# Patient Record
Sex: FEMALE | Race: WHITE | Employment: OTHER | URBAN - METROPOLITAN AREA
[De-identification: names, ages, dates, MRNs, and addresses within clinical notes are randomized per-mention and may not be internally consistent; named-entity substitution may affect disease eponyms.]

---

## 2017-02-09 ENCOUNTER — TELEPHONE (OUTPATIENT)
Dept: INTERNAL MEDICINE CLINIC | Facility: CLINIC | Age: 77
End: 2017-02-09

## 2018-01-01 ENCOUNTER — TELEPHONE (OUTPATIENT)
Dept: INTERNAL MEDICINE CLINIC | Facility: CLINIC | Age: 78
End: 2018-01-01

## 2018-01-01 ENCOUNTER — NURSE ONLY (OUTPATIENT)
Dept: INTERNAL MEDICINE CLINIC | Facility: CLINIC | Age: 78
End: 2018-01-01
Payer: MEDICARE

## 2018-01-01 ENCOUNTER — HOSPITAL ENCOUNTER (OUTPATIENT)
Dept: BONE DENSITY | Age: 78
Discharge: HOME | End: 2018-01-01
Attending: FAMILY MEDICINE
Payer: MEDICARE

## 2018-01-01 ENCOUNTER — HOSPITAL ENCOUNTER (OUTPATIENT)
Dept: MAMMOGRAPHY | Age: 78
Discharge: HOME | End: 2018-01-01
Attending: FAMILY MEDICINE
Payer: MEDICARE

## 2018-01-01 ENCOUNTER — APPOINTMENT (OUTPATIENT)
Dept: LAB | Age: 78
End: 2018-01-01
Attending: FAMILY MEDICINE
Payer: MEDICARE

## 2018-01-01 ENCOUNTER — OFFICE VISIT (OUTPATIENT)
Dept: INTERNAL MEDICINE CLINIC | Facility: CLINIC | Age: 78
End: 2018-01-01
Payer: MEDICARE

## 2018-01-01 VITALS
TEMPERATURE: 98 F | RESPIRATION RATE: 18 BRPM | SYSTOLIC BLOOD PRESSURE: 124 MMHG | DIASTOLIC BLOOD PRESSURE: 72 MMHG | OXYGEN SATURATION: 95 % | BODY MASS INDEX: 24 KG/M2 | HEART RATE: 88 BPM | WEIGHT: 134 LBS

## 2018-01-01 DIAGNOSIS — Z12.31 VISIT FOR SCREENING MAMMOGRAM: ICD-10-CM

## 2018-01-01 DIAGNOSIS — I25.10 CORONARY ARTERY DISEASE INVOLVING NATIVE CORONARY ARTERY OF NATIVE HEART WITHOUT ANGINA PECTORIS: ICD-10-CM

## 2018-01-01 DIAGNOSIS — M81.0 AGE-RELATED OSTEOPOROSIS WITHOUT CURRENT PATHOLOGICAL FRACTURE: Primary | ICD-10-CM

## 2018-01-01 DIAGNOSIS — M81.0 AGE-RELATED OSTEOPOROSIS WITHOUT CURRENT PATHOLOGICAL FRACTURE: ICD-10-CM

## 2018-01-01 DIAGNOSIS — S61.459A: ICD-10-CM

## 2018-01-01 DIAGNOSIS — R30.0 DYSURIA: ICD-10-CM

## 2018-01-01 DIAGNOSIS — E78.00 HYPERCHOLESTEROLEMIA: ICD-10-CM

## 2018-01-01 DIAGNOSIS — I10 ESSENTIAL HYPERTENSION: Primary | ICD-10-CM
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
## 2018-01-01 DIAGNOSIS — R73.09 ELEVATED GLUCOSE: ICD-10-CM

## 2018-01-01 PROCEDURE — 82040 ASSAY OF SERUM ALBUMIN: CPT

## 2018-01-01 PROCEDURE — 84100 ASSAY OF PHOSPHORUS: CPT

## 2018-01-01 PROCEDURE — 80048 BASIC METABOLIC PNL TOTAL CA: CPT

## 2018-01-01 PROCEDURE — 87186 SC STD MICRODIL/AGAR DIL: CPT

## 2018-01-01 PROCEDURE — 83735 ASSAY OF MAGNESIUM: CPT

## 2018-01-01 PROCEDURE — 87077 CULTURE AEROBIC IDENTIFY: CPT

## 2018-01-01 PROCEDURE — 87086 URINE CULTURE/COLONY COUNT: CPT

## 2018-01-01 PROCEDURE — 99214 OFFICE O/P EST MOD 30 MIN: CPT | Performed by: FAMILY MEDICINE

## 2018-01-01 PROCEDURE — 36415 COLL VENOUS BLD VENIPUNCTURE: CPT

## 2018-01-01 PROCEDURE — 96372 THER/PROPH/DIAG INJ SC/IM: CPT | Performed by: FAMILY MEDICINE

## 2018-01-01 PROCEDURE — 77063 BREAST TOMOSYNTHESIS BI: CPT | Performed by: FAMILY MEDICINE

## 2018-01-01 PROCEDURE — 77067 SCR MAMMO BI INCL CAD: CPT | Performed by: FAMILY MEDICINE

## 2018-01-01 PROCEDURE — 77080 DXA BONE DENSITY AXIAL: CPT | Performed by: FAMILY MEDICINE

## 2018-01-01 PROCEDURE — 82306 VITAMIN D 25 HYDROXY: CPT

## 2018-01-01 RX ORDER — HYDRALAZINE HYDROCHLORIDE 100 MG/1
TABLET, FILM COATED ORAL
Qty: 180 TABLET | Refills: 0 | OUTPATIENT
Start: 2018-01-01

## 2018-01-01 RX ORDER — CEPHALEXIN 500 MG/1
500 CAPSULE ORAL 2 TIMES DAILY
Qty: 20 CAPSULE | Refills: 0 | Status: SHIPPED | OUTPATIENT
Start: 2018-01-01 | End: 2019-01-01 | Stop reason: ALTCHOICE

## 2018-01-01 RX ORDER — LOSARTAN POTASSIUM 50 MG/1
TABLET ORAL
Qty: 90 TABLET | Refills: 0 | Status: SHIPPED | OUTPATIENT
Start: 2018-01-01 | End: 2019-01-01

## 2018-01-01 RX ORDER — SIMVASTATIN 20 MG
TABLET ORAL
Qty: 90 TABLET | Refills: 0 | OUTPATIENT
Start: 2018-01-01

## 2018-01-01 RX ORDER — METHENAMINE HIPPURATE 1000 MG/1
TABLET ORAL
Qty: 60 TABLET | Refills: 0 | OUTPATIENT
Start: 2018-01-01

## 2018-01-01 RX ORDER — SIMVASTATIN 10 MG
TABLET ORAL
Qty: 90 TABLET | Refills: 0 | Status: SHIPPED | OUTPATIENT
Start: 2018-01-01 | End: 2019-01-01

## 2018-01-01 RX ORDER — SPIRONOLACTONE 25 MG/1
TABLET ORAL
Qty: 90 TABLET | Refills: 0 | Status: SHIPPED | OUTPATIENT
Start: 2018-01-01 | End: 2019-01-01

## 2018-01-01 RX ORDER — LOSARTAN POTASSIUM 50 MG/1
TABLET ORAL
Qty: 90 TABLET | Refills: 0 | Status: SHIPPED | OUTPATIENT
Start: 2018-01-01 | End: 2018-01-01

## 2018-01-01 RX ORDER — LOSARTAN POTASSIUM 50 MG/1
TABLET ORAL
Qty: 90 TABLET | Refills: 0 | OUTPATIENT
Start: 2018-01-01

## 2018-01-01 RX ORDER — SIMVASTATIN 10 MG
TABLET ORAL
Qty: 90 TABLET | Refills: 0 | OUTPATIENT
Start: 2018-01-01

## 2018-01-01 RX ORDER — METHENAMINE HIPPURATE 1000 MG/1
TABLET ORAL
Qty: 60 TABLET | Refills: 0 | Status: SHIPPED | OUTPATIENT
Start: 2018-01-01 | End: 2018-01-01

## 2018-01-01 RX ORDER — HYDRALAZINE HYDROCHLORIDE 100 MG/1
TABLET, FILM COATED ORAL
Qty: 180 TABLET | Refills: 0 | Status: SHIPPED | OUTPATIENT
Start: 2018-01-01 | End: 2019-01-01

## 2018-01-01 RX ORDER — HYDRALAZINE HYDROCHLORIDE 100 MG/1
TABLET, FILM COATED ORAL
Qty: 180 TABLET | Refills: 0 | Status: SHIPPED | OUTPATIENT
Start: 2018-01-01 | End: 2018-01-01

## 2018-01-01 RX ORDER — METHENAMINE HIPPURATE 1000 MG/1
TABLET ORAL
Qty: 60 TABLET | Refills: 2 | Status: SHIPPED | OUTPATIENT
Start: 2018-01-01 | End: 2019-01-01

## 2018-02-02 ENCOUNTER — APPOINTMENT (OUTPATIENT)
Dept: CT IMAGING | Facility: HOSPITAL | Age: 78
End: 2018-02-02
Attending: EMERGENCY MEDICINE
Payer: COMMERCIAL

## 2018-02-02 ENCOUNTER — HOSPITAL ENCOUNTER (EMERGENCY)
Facility: HOSPITAL | Age: 78
Discharge: HOME | End: 2018-02-02
Attending: EMERGENCY MEDICINE
Payer: COMMERCIAL

## 2018-02-02 VITALS
RESPIRATION RATE: 16 BRPM | OXYGEN SATURATION: 100 % | DIASTOLIC BLOOD PRESSURE: 81 MMHG | HEART RATE: 64 BPM | TEMPERATURE: 97 F | SYSTOLIC BLOOD PRESSURE: 151 MMHG

## 2018-02-02 DIAGNOSIS — S00.83XA CONTUSION OF FACE, INITIAL ENCOUNTER: Primary | ICD-10-CM

## 2018-02-02 DIAGNOSIS — T07.XXXA MULTIPLE ABRASIONS: ICD-10-CM

## 2018-02-02 PROCEDURE — 99284 EMERGENCY DEPT VISIT MOD MDM: CPT

## 2018-02-02 PROCEDURE — 70450 CT HEAD/BRAIN W/O DYE: CPT | Performed by: EMERGENCY MEDICINE

## 2018-02-02 RX ORDER — GARLIC EXTRACT 500 MG
1 CAPSULE ORAL DAILY
COMMUNITY
End: 2019-01-01 | Stop reason: CLARIF

## 2018-02-02 RX ORDER — LOSARTAN POTASSIUM 50 MG/1
50 TABLET ORAL DAILY
COMMUNITY
End: 2018-03-06

## 2018-02-02 RX ORDER — HYDRALAZINE HYDROCHLORIDE 100 MG/1
100 TABLET, FILM COATED ORAL 2 TIMES DAILY
COMMUNITY
End: 2018-01-01

## 2018-02-02 RX ORDER — SPIRONOLACTONE 25 MG/1
12.5 TABLET ORAL DAILY
COMMUNITY
End: 2018-01-01

## 2018-02-02 RX ORDER — METHENAMINE HIPPURATE 1000 MG/1
1 TABLET ORAL 2 TIMES DAILY
COMMUNITY
End: 2018-03-06

## 2018-02-02 RX ORDER — REPAGLINIDE 0.5 MG/1
0.5 TABLET ORAL DAILY
COMMUNITY
End: 2019-01-01

## 2018-02-02 NOTE — ED INITIAL ASSESSMENT (HPI)
Patient arrives after falling outside and hitting left side of head in the mulch. Patient had no LOC, not taking blood thinners. Hx of kidney transplant in 2009. Not UTD on tetanus.

## 2018-02-03 NOTE — ED PROVIDER NOTES
Patient Seen in: [facility] Emergency Department    History   Patient presents with:  Trauma (cardiovascular, musculoskeletal)    Stated Complaint: fall    HPI    Patient presents with a head injury.   The patient was walking out of [place] and matthew tenderness, EOMI, pupils equal round and reactive to light, oropharynx clear, uvula midline. Neck: No midline C-spine tenderness. Cardiovascular: Regular rate and rhythm, no murmurs. Respiratory: Lungs clear to auscultation.   Extremities: No bony tender 02 1835  ------------------------------------------------------------  The patient's wounds were cleaned and did not require repair. MDM     The patient's imaging is reassuring in terms of trauma.   She was counseled regarding the sinus disease that was

## 2018-02-16 ENCOUNTER — PRIOR ORIGINAL RECORDS (OUTPATIENT)
Dept: OTHER | Age: 78
End: 2018-02-16

## 2018-02-16 ENCOUNTER — LAB ENCOUNTER (OUTPATIENT)
Dept: LAB | Age: 78
End: 2018-02-16
Attending: INTERNAL MEDICINE
Payer: MEDICARE

## 2018-02-16 DIAGNOSIS — Q61.3 POLYCYSTIC KIDNEY: ICD-10-CM

## 2018-02-16 DIAGNOSIS — Z94.81 BONE MARROW TRANSPLANT STATUS (HCC): ICD-10-CM

## 2018-02-16 DIAGNOSIS — I12.9 MALIGNANT HYPERTENSIVE KIDNEY DISEASE WITH CHRONIC KIDNEY DISEASE STAGE I THROUGH STAGE IV, OR UNSPECIFIED(403.00): Primary | ICD-10-CM

## 2018-02-16 LAB
25-HYDROXYVITAMIN D (TOTAL): 73 NG/ML (ref 30–100)
ALBUMIN SERPL-MCNC: 3.9 G/DL (ref 3.5–4.8)
ALP LIVER SERPL-CCNC: 53 U/L (ref 55–142)
ALT SERPL-CCNC: 25 U/L (ref 14–54)
AST SERPL-CCNC: 17 U/L (ref 15–41)
BASOPHILS # BLD AUTO: 0.06 X10(3) UL (ref 0–0.1)
BASOPHILS NFR BLD AUTO: 0.9 %
BILIRUB SERPL-MCNC: 1.1 MG/DL (ref 0.1–2)
BILIRUB UR QL STRIP.AUTO: NEGATIVE
BUN BLD-MCNC: 27 MG/DL (ref 8–20)
CALCIUM BLD-MCNC: 10 MG/DL (ref 8.3–10.3)
CHLORIDE: 106 MMOL/L (ref 101–111)
CO2: 26 MMOL/L (ref 22–32)
CREAT BLD-MCNC: 0.73 MG/DL (ref 0.55–1.02)
CREAT UR-SCNC: 17 MG/DL
CREAT UR-SCNC: 17 MG/DL
EOSINOPHIL # BLD AUTO: 0.13 X10(3) UL (ref 0–0.3)
EOSINOPHIL NFR BLD AUTO: 1.9 %
ERYTHROCYTE [DISTWIDTH] IN BLOOD BY AUTOMATED COUNT: 14.8 % (ref 11.5–16)
EST. AVERAGE GLUCOSE BLD GHB EST-MCNC: 140 MG/DL (ref 68–126)
GLUCOSE BLD-MCNC: 132 MG/DL (ref 70–99)
GLUCOSE UR STRIP.AUTO-MCNC: NEGATIVE MG/DL
HAV IGM SER QL: 1.9 MG/DL (ref 1.7–3)
HBA1C MFR BLD HPLC: 6.5 % (ref ?–5.7)
HCT VFR BLD AUTO: 45.9 % (ref 34–50)
HGB BLD-MCNC: 14 G/DL (ref 12–16)
IMMATURE GRANULOCYTE COUNT: 0.03 X10(3) UL (ref 0–1)
IMMATURE GRANULOCYTE RATIO %: 0.4 %
KETONES UR STRIP.AUTO-MCNC: NEGATIVE MG/DL
LYMPHOCYTES # BLD AUTO: 0.87 X10(3) UL (ref 0.9–4)
LYMPHOCYTES NFR BLD AUTO: 12.6 %
M PROTEIN MFR SERPL ELPH: 6.8 G/DL (ref 6.1–8.3)
MCH RBC QN AUTO: 27.9 PG (ref 27–33.2)
MCHC RBC AUTO-ENTMCNC: 30.5 G/DL (ref 31–37)
MCV RBC AUTO: 91.6 FL (ref 81–100)
MICROALBUMIN UR-MCNC: 1.39 MG/DL
MICROALBUMIN/CREAT 24H UR-RTO: 81.8 UG/MG (ref ?–30)
MONOCYTES # BLD AUTO: 0.72 X10(3) UL (ref 0.1–1)
MONOCYTES NFR BLD AUTO: 10.5 %
NEUTROPHIL ABS PRELIM: 5.07 X10 (3) UL (ref 1.3–6.7)
NEUTROPHILS # BLD AUTO: 5.07 X10(3) UL (ref 1.3–6.7)
NEUTROPHILS NFR BLD AUTO: 73.7 %
NITRITE UR QL STRIP.AUTO: NEGATIVE
PH UR STRIP.AUTO: 6 [PH] (ref 4.5–8)
PHOSPHATE SERPL-MCNC: 2.6 MG/DL (ref 2.5–4.9)
PLATELET # BLD AUTO: 234 10(3)UL (ref 150–450)
POTASSIUM SERPL-SCNC: 4.6 MMOL/L (ref 3.6–5.1)
PROT UR STRIP.AUTO-MCNC: NEGATIVE MG/DL
PROT UR-MCNC: <5 MG/DL
PTH-INTACT SERPL-MCNC: 91.1 PG/ML (ref 11.1–79.5)
RBC # BLD AUTO: 5.01 X10(6)UL (ref 3.8–5.1)
RBC UR QL AUTO: NEGATIVE
RED CELL DISTRIBUTION WIDTH-SD: 50.4 FL (ref 35.1–46.3)
SODIUM SERPL-SCNC: 139 MMOL/L (ref 136–144)
SP GR UR STRIP.AUTO: 1 (ref 1–1.03)
UROBILINOGEN UR STRIP.AUTO-MCNC: <2 MG/DL
WBC # BLD AUTO: 6.9 X10(3) UL (ref 4–13)

## 2018-02-16 PROCEDURE — 81001 URINALYSIS AUTO W/SCOPE: CPT

## 2018-02-16 PROCEDURE — 80197 ASSAY OF TACROLIMUS: CPT

## 2018-02-16 PROCEDURE — 85025 COMPLETE CBC W/AUTO DIFF WBC: CPT

## 2018-02-16 PROCEDURE — 87799 DETECT AGENT NOS DNA QUANT: CPT

## 2018-02-16 PROCEDURE — 84156 ASSAY OF PROTEIN URINE: CPT

## 2018-02-16 PROCEDURE — 82306 VITAMIN D 25 HYDROXY: CPT

## 2018-02-16 PROCEDURE — 83735 ASSAY OF MAGNESIUM: CPT

## 2018-02-16 PROCEDURE — 84100 ASSAY OF PHOSPHORUS: CPT

## 2018-02-16 PROCEDURE — 82570 ASSAY OF URINE CREATININE: CPT

## 2018-02-16 PROCEDURE — 80053 COMPREHEN METABOLIC PANEL: CPT

## 2018-02-16 PROCEDURE — 83036 HEMOGLOBIN GLYCOSYLATED A1C: CPT

## 2018-02-16 PROCEDURE — 80061 LIPID PANEL: CPT

## 2018-02-16 PROCEDURE — 83970 ASSAY OF PARATHORMONE: CPT

## 2018-02-16 PROCEDURE — 82043 UR ALBUMIN QUANTITATIVE: CPT

## 2018-02-16 PROCEDURE — 82652 VIT D 1 25-DIHYDROXY: CPT

## 2018-02-16 PROCEDURE — 36415 COLL VENOUS BLD VENIPUNCTURE: CPT

## 2018-02-18 LAB — TACROLIMUS: 5.3 NG/ML

## 2018-02-19 LAB
BK VIRUS DNA, QUANT COPY/ML: <390 CPY/ML
BK VIRUS DNA, QUANT INTERP: NOT DETECTED
BK VIRUS DNA, QUANTITATION: <2.6 LOG
CHOLEST SMN-MCNC: 156 MG/DL (ref ?–200)
HDLC SERPL-MCNC: 59 MG/DL (ref 45–?)
HDLC SERPL: 2.64 {RATIO} (ref ?–4.44)
LDLC SERPL CALC-MCNC: 75 MG/DL (ref ?–130)
NONHDLC SERPL-MCNC: 97 MG/DL (ref ?–130)
TRIGL SERPL-MCNC: 109 MG/DL (ref ?–150)
VLDLC SERPL CALC-MCNC: 22 MG/DL (ref 5–40)

## 2018-03-06 ENCOUNTER — OFFICE VISIT (OUTPATIENT)
Dept: INTERNAL MEDICINE CLINIC | Facility: CLINIC | Age: 78
End: 2018-03-06

## 2018-03-06 VITALS
TEMPERATURE: 98 F | HEIGHT: 62.75 IN | WEIGHT: 135.5 LBS | HEART RATE: 64 BPM | BODY MASS INDEX: 24.31 KG/M2 | DIASTOLIC BLOOD PRESSURE: 72 MMHG | RESPIRATION RATE: 16 BRPM | SYSTOLIC BLOOD PRESSURE: 132 MMHG

## 2018-03-06 DIAGNOSIS — H40.9 GLAUCOMA, UNSPECIFIED GLAUCOMA TYPE, UNSPECIFIED LATERALITY: ICD-10-CM

## 2018-03-06 DIAGNOSIS — E78.00 HYPERCHOLESTEROLEMIA: ICD-10-CM

## 2018-03-06 DIAGNOSIS — R73.09 ELEVATED GLUCOSE: ICD-10-CM

## 2018-03-06 DIAGNOSIS — Z00.00 INITIAL MEDICARE ANNUAL WELLNESS VISIT: Primary | ICD-10-CM

## 2018-03-06 DIAGNOSIS — Q61.3 POLYCYSTIC KIDNEY DISEASE: ICD-10-CM

## 2018-03-06 DIAGNOSIS — Z78.0 POSTMENOPAUSAL: ICD-10-CM

## 2018-03-06 DIAGNOSIS — M81.0 AGE RELATED OSTEOPOROSIS, UNSPECIFIED PATHOLOGICAL FRACTURE PRESENCE: ICD-10-CM

## 2018-03-06 DIAGNOSIS — Z00.00 ENCOUNTER FOR ANNUAL HEALTH EXAMINATION: ICD-10-CM

## 2018-03-06 DIAGNOSIS — I10 ESSENTIAL HYPERTENSION: ICD-10-CM

## 2018-03-06 DIAGNOSIS — Z12.31 VISIT FOR SCREENING MAMMOGRAM: ICD-10-CM

## 2018-03-06 DIAGNOSIS — R79.89 ELEVATED PTHRP LEVEL: ICD-10-CM

## 2018-03-06 DIAGNOSIS — N39.0 CHRONIC UTI: ICD-10-CM

## 2018-03-06 DIAGNOSIS — I25.10 CORONARY ARTERY DISEASE INVOLVING NATIVE CORONARY ARTERY OF NATIVE HEART WITHOUT ANGINA PECTORIS: ICD-10-CM

## 2018-03-06 PROCEDURE — G0438 PPPS, INITIAL VISIT: HCPCS | Performed by: FAMILY MEDICINE

## 2018-03-06 PROCEDURE — 99203 OFFICE O/P NEW LOW 30 MIN: CPT | Performed by: FAMILY MEDICINE

## 2018-03-06 RX ORDER — LOSARTAN POTASSIUM 50 MG/1
50 TABLET ORAL DAILY
Qty: 90 TABLET | Refills: 1 | Status: SHIPPED | OUTPATIENT
Start: 2018-03-06 | End: 2018-01-01

## 2018-03-06 RX ORDER — METHENAMINE HIPPURATE 1000 MG/1
1 TABLET ORAL 2 TIMES DAILY
Qty: 60 TABLET | Refills: 5 | Status: SHIPPED | OUTPATIENT
Start: 2018-03-06 | End: 2018-08-10

## 2018-03-06 NOTE — PROGRESS NOTES
HPI:   Roxane Quiroz is a 69year old former female pt who presents for a Medicare Initial Annual Wellness visit (Once after 12 month Medicare anniversary) .   Last appt w me in 2014    Seeing Drs out of [place] are current        Her la care planning including the explanation and discussion of advance directives standard forms performed Face to Face with patient and Family/surrogate (if present), and forms available to patient in AVS       She does NOT have a Power of  for Sunust [amlodipine]. CURRENT MEDICATIONS:     Outpatient Prescriptions Marked as Taking for the 3/6/18 encounter (Office Visit) with Gricelda Stark MD:  Methenamine Hippurate 1 g Oral Tab Take 1 tablet (1 g total) by mouth 2 (two) times daily.    Losartan [place] transplantation of kidney (3/7/2009); and colonoscopy (6/2013    fall 2017). Her family history includes Cancer in her maternal grandfather; Heart Disease in her maternal grandmother; Stroke in her mother.    SOCIAL HISTORY:   She  reports that she has q auscultation bilaterally, respirations unlabored   Heart:  Regular rate and rhythm, S1 and S2 normal, no murmur, rub, or gallop   Abdomen:   Soft, non-tender, bowel sounds active all four quadrants,  no masses, no organomegaly   Pelvic: Deferred   Extremit Glaucoma, unspecified glaucoma type, unspecified laterality  Plan: seeing eye dr    On  Drops      (M81.0) Age related osteoporosis, unspecified pathological fracture presence  Plan: on  fosamax    (R73.09) Elevated glucose  Plan: discussed her labs Occult Blood Annually No results found for: FOB No flowsheet data found. Glaucoma Screening      Ophthalmology Visit Annually: Diabetics, FHx Glaucoma, AA>50, > 65 No flowsheet data found.     Bone Density Screening      Dexascan Every two years or Procedure      Annual Monitoring of Persistent     Medications (ACE/ARB, digoxin diuretics, anticonvulsants.)    Potassium  Annually Potassium (mmol/L)   Date Value   02/16/2018 4.6    No flowsheet data found.     Creatinine  Annually Creatinine (mg/dL)

## 2018-03-06 NOTE — PATIENT INSTRUCTIONS
Marsha Glynn's SCREENING SCHEDULE   Tests on this list are recommended by your physician but may not be covered, or covered at this frequency, by your insurer. Please check with your insurance carrier before scheduling to verify coverage.    PREVENTATI Covered every 10 years- more often if abnormal There are no preventive care reminders to display for this patient.  Update Health Maintenance if applicable    Flex Sigmoidoscopy Screen  Covered every 5 years No results found for this or any previous visit Please get once after your 65th birthday    Pneumococcal 23 (Pneumovax)  Covered Once after 65 No orders found for this or any previous visit.  Please get once after your 65th birthday    Hepatitis B for Moderate/High Risk       No orders found for this or

## 2018-03-12 ENCOUNTER — PRIOR ORIGINAL RECORDS (OUTPATIENT)
Dept: OTHER | Age: 78
End: 2018-03-12

## 2018-03-12 ENCOUNTER — LAB ENCOUNTER (OUTPATIENT)
Dept: LAB | Age: 78
End: 2018-03-12
Attending: INTERNAL MEDICINE
Payer: MEDICARE

## 2018-03-12 DIAGNOSIS — Z94.0 KIDNEY REPLACED BY TRANSPLANT: Primary | ICD-10-CM

## 2018-03-12 LAB
ALBUMIN SERPL-MCNC: 3.7 G/DL (ref 3.5–4.8)
ALP LIVER SERPL-CCNC: 59 U/L (ref 55–142)
ALT SERPL-CCNC: 21 U/L (ref 14–54)
AST SERPL-CCNC: 16 U/L (ref 15–41)
BASOPHILS # BLD AUTO: 0.05 X10(3) UL (ref 0–0.1)
BASOPHILS NFR BLD AUTO: 0.5 %
BILIRUB SERPL-MCNC: 0.6 MG/DL (ref 0.1–2)
BILIRUB UR QL STRIP.AUTO: NEGATIVE
BUN BLD-MCNC: 22 MG/DL (ref 8–20)
CALCIUM BLD-MCNC: 9.3 MG/DL (ref 8.3–10.3)
CHLORIDE: 103 MMOL/L (ref 101–111)
CLARITY UR REFRACT.AUTO: CLEAR
CO2: 27 MMOL/L (ref 22–32)
COLOR UR AUTO: YELLOW
CREAT BLD-MCNC: 0.75 MG/DL (ref 0.55–1.02)
CREAT UR-SCNC: 24.8 MG/DL
EOSINOPHIL # BLD AUTO: 0.17 X10(3) UL (ref 0–0.3)
EOSINOPHIL NFR BLD AUTO: 1.7 %
ERYTHROCYTE [DISTWIDTH] IN BLOOD BY AUTOMATED COUNT: 14.4 % (ref 11.5–16)
GLUCOSE BLD-MCNC: 111 MG/DL (ref 70–99)
GLUCOSE UR STRIP.AUTO-MCNC: NEGATIVE MG/DL
HAV IGM SER QL: 1.7 MG/DL (ref 1.7–3)
HCT VFR BLD AUTO: 45 % (ref 34–50)
HGB BLD-MCNC: 13.3 G/DL (ref 12–16)
IMMATURE GRANULOCYTE COUNT: 0.05 X10(3) UL (ref 0–1)
IMMATURE GRANULOCYTE RATIO %: 0.5 %
KETONES UR STRIP.AUTO-MCNC: NEGATIVE MG/DL
LYMPHOCYTES # BLD AUTO: 1.49 X10(3) UL (ref 0.9–4)
LYMPHOCYTES NFR BLD AUTO: 14.6 %
M PROTEIN MFR SERPL ELPH: 6.6 G/DL (ref 6.1–8.3)
MCH RBC QN AUTO: 27.3 PG (ref 27–33.2)
MCHC RBC AUTO-ENTMCNC: 29.6 G/DL (ref 31–37)
MCV RBC AUTO: 92.2 FL (ref 81–100)
MONOCYTES # BLD AUTO: 0.97 X10(3) UL (ref 0.1–1)
MONOCYTES NFR BLD AUTO: 9.5 %
NEUTROPHIL ABS PRELIM: 7.46 X10 (3) UL (ref 1.3–6.7)
NEUTROPHILS # BLD AUTO: 7.46 X10(3) UL (ref 1.3–6.7)
NEUTROPHILS NFR BLD AUTO: 73.2 %
NITRITE UR QL STRIP.AUTO: POSITIVE
PH UR STRIP.AUTO: 5 [PH] (ref 4.5–8)
PHOSPHATE SERPL-MCNC: 2.5 MG/DL (ref 2.5–4.9)
PLATELET # BLD AUTO: 264 10(3)UL (ref 150–450)
POTASSIUM SERPL-SCNC: 3.9 MMOL/L (ref 3.6–5.1)
PROT UR STRIP.AUTO-MCNC: NEGATIVE MG/DL
PROT UR-MCNC: <5 MG/DL
RBC # BLD AUTO: 4.88 X10(6)UL (ref 3.8–5.1)
RBC UR QL AUTO: NEGATIVE
RED CELL DISTRIBUTION WIDTH-SD: 48.6 FL (ref 35.1–46.3)
SODIUM SERPL-SCNC: 136 MMOL/L (ref 136–144)
SP GR UR STRIP.AUTO: 1.01 (ref 1–1.03)
UROBILINOGEN UR STRIP.AUTO-MCNC: <2 MG/DL
WBC # BLD AUTO: 10.2 X10(3) UL (ref 4–13)

## 2018-03-12 PROCEDURE — 85025 COMPLETE CBC W/AUTO DIFF WBC: CPT

## 2018-03-12 PROCEDURE — 81001 URINALYSIS AUTO W/SCOPE: CPT

## 2018-03-12 PROCEDURE — 83735 ASSAY OF MAGNESIUM: CPT

## 2018-03-12 PROCEDURE — 82570 ASSAY OF URINE CREATININE: CPT

## 2018-03-12 PROCEDURE — 36415 COLL VENOUS BLD VENIPUNCTURE: CPT

## 2018-03-12 PROCEDURE — 80197 ASSAY OF TACROLIMUS: CPT

## 2018-03-12 PROCEDURE — 84156 ASSAY OF PROTEIN URINE: CPT

## 2018-03-12 PROCEDURE — 80053 COMPREHEN METABOLIC PANEL: CPT

## 2018-03-12 PROCEDURE — 84100 ASSAY OF PHOSPHORUS: CPT

## 2018-03-14 LAB — TACROLIMUS: 4.8 NG/ML

## 2018-04-24 ENCOUNTER — TELEPHONE (OUTPATIENT)
Dept: NEPHROLOGY | Facility: CLINIC | Age: 78
End: 2018-04-24

## 2018-06-05 ENCOUNTER — OFFICE VISIT (OUTPATIENT)
Dept: NEPHROLOGY | Facility: CLINIC | Age: 78
End: 2018-06-05

## 2018-06-05 VITALS — DIASTOLIC BLOOD PRESSURE: 74 MMHG | BODY MASS INDEX: 25 KG/M2 | WEIGHT: 140 LBS | SYSTOLIC BLOOD PRESSURE: 136 MMHG

## 2018-06-05 DIAGNOSIS — I10 ESSENTIAL HYPERTENSION: Primary | ICD-10-CM

## 2018-06-05 DIAGNOSIS — Q61.3 PKD (POLYCYSTIC KIDNEY DISEASE): ICD-10-CM

## 2018-06-05 PROBLEM — Z94.0 KIDNEY TRANSPLANT RECIPIENT: Status: ACTIVE | Noted: 2018-06-05

## 2018-06-05 PROCEDURE — 99204 OFFICE O/P NEW MOD 45 MIN: CPT | Performed by: INTERNAL MEDICINE

## 2018-06-05 NOTE — PROGRESS NOTES
Nephrology Consult Note    REASON FOR CONSULT: s/p kidney transplant    ASSESSMENT/PLAN:        1) s/p kidney transplant- at [facility] in 2009 for polycystic kidney disease; was on dialysis for 2 years in [facility] with Dr. Yasmany [place].   Her donor was a father. Her father was on dialysis before he ultimately passed. She has had abdominal aortic aneurysm repair as well as a stent in her right coronary artery; also had a partial hepatectomy as above due to massive cyst.  No history of pulmonary disease. mouth daily. Disp:  Rfl:    HydrALAZINE HCl 100 MG Oral Tab Take 100 mg by mouth 2 (two) times daily. Disp:  Rfl:    tacrolimus (PROGRAF) 1 MG Oral Cap Take 1 mg by mouth 2 (two) times daily.    Disp:  Rfl:    predniSONE (DELTASONE) 5 MG Oral Tab Take 5 mg Comment:diabetic diet   Stress Concern          No  Weight Concern          No         Family History:  Family History   Problem Relation Age of Onset   • Stroke Mother    • Heart Disease Maternal Grandmother    • Cancer Maternal Grandfather         PHYSIC

## 2018-06-13 NOTE — TELEPHONE ENCOUNTER
Metoprolol 25 mg 1 tab bid filled its in as historical     LOV 3-6-18     Follow up in 6 months     Labs 3-12-18

## 2018-08-02 RX ORDER — SIMVASTATIN 10 MG
TABLET ORAL
Qty: 90 TABLET | Refills: 0 | Status: SHIPPED | OUTPATIENT
Start: 2018-08-02 | End: 2018-01-01

## 2018-08-10 NOTE — TELEPHONE ENCOUNTER
Last office visit 3/6/18 with Dr. Bassem Morin. Appointment 9/21/18 with Dr. Bassem Morin. Is this a new medication request?  Not in medication list...

## 2018-08-13 RX ORDER — LANCETS
EACH MISCELLANEOUS
Qty: 100 EACH | Refills: 1 | Status: SHIPPED | OUTPATIENT
Start: 2018-08-13 | End: 2019-01-01

## 2018-08-13 RX ORDER — METHENAMINE HIPPURATE 1000 MG/1
TABLET ORAL
Qty: 60 TABLET | Refills: 1 | Status: SHIPPED | OUTPATIENT
Start: 2018-08-13 | End: 2018-01-01

## 2018-08-13 RX ORDER — PERPHENAZINE 16 MG/1
TABLET, FILM COATED ORAL
Qty: 100 STRIP | Refills: 0 | OUTPATIENT
Start: 2018-08-13

## 2018-08-13 RX ORDER — LANCETS
EACH MISCELLANEOUS
Qty: 100 EACH | Refills: 0 | OUTPATIENT
Start: 2018-08-13

## 2018-08-30 NOTE — TELEPHONE ENCOUNTER
Losartan 50 mg 1 tab daily filled 3/6/18 90 with 1 refill     LOV 3/6/18    Follow up in 6 months     Next apt 9/21/18    Labs

## 2018-09-11 NOTE — TELEPHONE ENCOUNTER
Last office visit 3/6/18 with Dr. Deng Dose. Appointment 9/21/18 with Dr. Deng Dose. Last refill 6/14/18 (#180, 0 refills).

## 2018-10-03 NOTE — PROGRESS NOTES
HPI:    Patient ID: Janak Ramos is a 69year old female.     HPI  Here to go over few things   Needs order for test strips    Had labs done at [facility] in August   Will get me copy   To see Dr Cinthia Strange in Jan     Aware needs bone density    Last was 2 yr ago (DELTASONE) 5 MG Oral Tab Take 5 mg by mouth daily. Disp:  Rfl:    Mycophenolate Sodium (MYFORTIC) 180 MG Oral Tab EC Take 360 mg by mouth 2 (two) times daily.    Disp:  Rfl:    Metoprolol Succinate ER (TOPROL XL) 25 MG Oral Tablet 24 Hr Take 50 mg by mouth SCREENING BILAT (CPT=77067)        Ordered         Essential hypertension  (primary encounter diagnosis)  Hypercholesterolemia  Elevated glucose  Coronary artery disease involving native coronary artery of native heart without angina pectoris    No orders

## 2018-10-05 NOTE — TELEPHONE ENCOUNTER
Patient is requesting to speak with a nurse in regards to her prescription for Glucose Blood (MCKINLEY CONTOUR TEST) In Vitro Strip. She stated that she is having issues refilling the prescription.  [place] [phone]  [facility]

## 2018-10-31 NOTE — TELEPHONE ENCOUNTER
Refill requested:   Requested Prescriptions     Pending Prescriptions Disp Refills   • METHENAMINE HIPPURATE 1 g Oral Tab [Pharmacy Med Name: METHENAMINE HIPPURATE 1GM TABLETS] 60 tablet 0     Sig: TAKE 1 TABLET BY MOUTH TWICE DAILY       Failed protocol-

## 2018-11-29 NOTE — TELEPHONE ENCOUNTER
Methenamine Hippurate  Last OV relevant to medication: 10/3/18  Last refill date: 10/31/18     #/refills: 0  When pt was asked to return for OV: None  Upcoming appt/reason: 4/8/19

## 2018-11-30 NOTE — TELEPHONE ENCOUNTER
----- Message from Brii Chavez MD sent at 11/26/2018  1:04 PM CST -----  + osteoporosis   Is she taking the fosamax as directed?

## 2018-12-17 ENCOUNTER — PRIOR ORIGINAL RECORDS (OUTPATIENT)
Dept: OTHER | Age: 78
End: 2018-12-17

## 2018-12-20 ENCOUNTER — MYAURORA ACCOUNT LINK (OUTPATIENT)
Dept: OTHER | Age: 78
End: 2018-12-20

## 2018-12-20 ENCOUNTER — PRIOR ORIGINAL RECORDS (OUTPATIENT)
Dept: OTHER | Age: 78
End: 2018-12-20

## 2018-12-20 NOTE — TELEPHONE ENCOUNTER
[facility] would like test results/labs for this patient specifically lipids  Please fax to [phone]

## 2018-12-27 LAB
ALBUMIN: 3.3 G/DL
ALBUMIN: 3.9 G/DL
ALKALINE PHOSPHATATE(ALK PHOS): 53 IU/L
BILIRUBIN TOTAL: 1.1 MG/DL
BUN: 22 MG/DL
BUN: 27 MG/DL
CALCIUM: 10 MG/DL
CALCIUM: 9.3 MG/DL
CHLORIDE: 106 MEQ/L
CHLORIDE: 106 MEQ/L
CHOLESTEROL, TOTAL: 156 MG/DL
CREATININE, SERUM: 0.73 MG/DL
CREATININE, SERUM: 0.82 MG/DL
GLUCOSE: 132 MG/DL
GLUCOSE: 97 MG/DL
HDL CHOLESTEROL: 59 MG/DL
HEMATOCRIT: 45 %
HEMOGLOBIN A1C: 6.5 %
HEMOGLOBIN: 13.3 G/DL
LDL CHOLESTEROL: 75 MG/DL
MAGNESIUM: 1.8 MG/DL
MAGNESIUM: 1.9 MG/DL
PLATELETS: 264 K/UL
POTASSIUM, SERUM: 4.6 MEQ/L
POTASSIUM, SERUM: 4.6 MEQ/L
PROTEIN, TOTAL: 6.8 G/DL
PTH, INTACT: 91.1 PG/ML
RED BLOOD COUNT: 4.88 X 10-6/U
SGOT (AST): 17 IU/L
SGPT (ALT): 25 IU/L
SODIUM: 139 MEQ/L
SODIUM: 141 MEQ/L
TACROLIMUS TROUGH LEVEL: 4.8
TRIGLYCERIDES: 109 MG/DL
VITAMIN D 25-OH: 58.7 NG/ML
VITAMIN D 25-OH: 73 NG/ML
WHITE BLOOD COUNT: 10.2 X 10-3/U

## 2019-01-01 ENCOUNTER — TELEPHONE (OUTPATIENT)
Dept: INTERNAL MEDICINE CLINIC | Facility: CLINIC | Age: 79
End: 2019-01-01

## 2019-01-01 ENCOUNTER — HOSPITAL ENCOUNTER (OUTPATIENT)
Dept: GENERAL RADIOLOGY | Age: 79
Discharge: HOME | End: 2019-01-01
Attending: NURSE PRACTITIONER
Payer: MEDICARE

## 2019-01-01 ENCOUNTER — OFFICE VISIT (OUTPATIENT)
Dept: NEPHROLOGY | Facility: CLINIC | Age: 79
End: 2019-01-01
Payer: MEDICARE

## 2019-01-01 ENCOUNTER — PATIENT OUTREACH (OUTPATIENT)
Dept: CASE MANAGEMENT | Age: 79
End: 2019-01-01

## 2019-01-01 ENCOUNTER — APPOINTMENT (OUTPATIENT)
Dept: NUCLEAR MEDICINE | Facility: HOSPITAL | Age: 79
DRG: 392 | End: 2019-01-01
Attending: INTERNAL MEDICINE
Payer: MEDICARE

## 2019-01-01 ENCOUNTER — ANESTHESIA (OUTPATIENT)
Dept: ENDOSCOPY | Facility: HOSPITAL | Age: 79
End: 2019-01-01

## 2019-01-01 ENCOUNTER — HOSPITAL ENCOUNTER (EMERGENCY)
Facility: HOSPITAL | Age: 79
Discharge: HOME | End: 2019-01-01
Attending: EMERGENCY MEDICINE
Payer: MEDICARE

## 2019-01-01 ENCOUNTER — OFFICE VISIT (OUTPATIENT)
Dept: INTERNAL MEDICINE CLINIC | Facility: CLINIC | Age: 79
End: 2019-01-01
Payer: MEDICARE

## 2019-01-01 ENCOUNTER — EXTERNAL RECORD (OUTPATIENT)
Dept: CARDIOLOGY | Age: 79
End: 2019-01-01

## 2019-01-01 ENCOUNTER — APPOINTMENT (OUTPATIENT)
Dept: GENERAL RADIOLOGY | Facility: HOSPITAL | Age: 79
DRG: 392 | End: 2019-01-01
Attending: EMERGENCY MEDICINE
Payer: MEDICARE

## 2019-01-01 ENCOUNTER — APPOINTMENT (OUTPATIENT)
Dept: MRI IMAGING | Facility: HOSPITAL | Age: 79
DRG: 393 | End: 2019-01-01
Attending: NURSE PRACTITIONER
Payer: MEDICARE

## 2019-01-01 ENCOUNTER — APPOINTMENT (OUTPATIENT)
Dept: GENERAL RADIOLOGY | Facility: HOSPITAL | Age: 79
DRG: 643 | End: 2019-01-01
Attending: INTERNAL MEDICINE
Payer: MEDICARE

## 2019-01-01 ENCOUNTER — HOSPITAL ENCOUNTER (INPATIENT)
Facility: HOSPITAL | Age: 79
LOS: 10 days | Discharge: HOSPICE | DRG: 643 | End: 2019-01-01
Attending: EMERGENCY MEDICINE | Admitting: INTERNAL MEDICINE
Payer: MEDICARE

## 2019-01-01 ENCOUNTER — HOSPITAL ENCOUNTER (OUTPATIENT)
Dept: CV DIAGNOSTICS | Facility: HOSPITAL | Age: 79
Discharge: HOME | End: 2019-01-01
Attending: INTERNAL MEDICINE

## 2019-01-01 ENCOUNTER — ANESTHESIA EVENT (OUTPATIENT)
Dept: ENDOSCOPY | Facility: HOSPITAL | Age: 79
End: 2019-01-01

## 2019-01-01 ENCOUNTER — APPOINTMENT (OUTPATIENT)
Dept: CT IMAGING | Facility: HOSPITAL | Age: 79
End: 2019-01-01
Attending: EMERGENCY MEDICINE
Payer: MEDICARE

## 2019-01-01 ENCOUNTER — APPOINTMENT (OUTPATIENT)
Dept: LAB | Age: 79
End: 2019-01-01
Attending: FAMILY MEDICINE
Payer: MEDICARE

## 2019-01-01 ENCOUNTER — HOSPITAL ENCOUNTER (INPATIENT)
Facility: HOSPITAL | Age: 79
LOS: 5 days | Discharge: HOME HEALTH | DRG: 393 | End: 2019-01-01
Attending: EMERGENCY MEDICINE | Admitting: HOSPITALIST
Payer: MEDICARE

## 2019-01-01 ENCOUNTER — HOSPITAL ENCOUNTER (OUTPATIENT)
Dept: ULTRASOUND IMAGING | Age: 79
Discharge: HOME | End: 2019-01-01
Attending: SURGERY
Payer: MEDICARE

## 2019-01-01 ENCOUNTER — ANESTHESIA EVENT (OUTPATIENT)
Dept: ENDOSCOPY | Facility: HOSPITAL | Age: 79
DRG: 394 | End: 2019-01-01
Payer: MEDICARE

## 2019-01-01 ENCOUNTER — APPOINTMENT (OUTPATIENT)
Dept: CT IMAGING | Facility: HOSPITAL | Age: 79
DRG: 643 | End: 2019-01-01
Attending: INTERNAL MEDICINE
Payer: MEDICARE

## 2019-01-01 ENCOUNTER — HOSPITAL ENCOUNTER (OUTPATIENT)
Facility: HOSPITAL | Age: 79
Setting detail: OBSERVATION
Discharge: HOME | End: 2019-01-01
Attending: EMERGENCY MEDICINE | Admitting: HOSPITALIST
Payer: MEDICARE

## 2019-01-01 ENCOUNTER — HOSPITAL ENCOUNTER (EMERGENCY)
Age: 79
Discharge: HOME | End: 2019-01-01
Attending: EMERGENCY MEDICINE
Payer: MEDICARE

## 2019-01-01 ENCOUNTER — APPOINTMENT (OUTPATIENT)
Dept: GENERAL RADIOLOGY | Facility: HOSPITAL | Age: 79
DRG: 394 | End: 2019-01-01
Attending: EMERGENCY MEDICINE
Payer: MEDICARE

## 2019-01-01 ENCOUNTER — LAB ENCOUNTER (OUTPATIENT)
Dept: LAB | Age: 79
End: 2019-01-01
Attending: FAMILY MEDICINE
Payer: MEDICARE

## 2019-01-01 ENCOUNTER — APPOINTMENT (OUTPATIENT)
Dept: CT IMAGING | Facility: HOSPITAL | Age: 79
DRG: 643 | End: 2019-01-01
Attending: Other
Payer: MEDICARE

## 2019-01-01 ENCOUNTER — HOSPITAL ENCOUNTER (OUTPATIENT)
Age: 79
Discharge: HOME | End: 2019-01-01
Payer: MEDICARE

## 2019-01-01 ENCOUNTER — APPOINTMENT (OUTPATIENT)
Dept: GENERAL RADIOLOGY | Facility: HOSPITAL | Age: 79
DRG: 392 | End: 2019-01-01
Attending: STUDENT IN AN ORGANIZED HEALTH CARE EDUCATION/TRAINING PROGRAM
Payer: MEDICARE

## 2019-01-01 ENCOUNTER — APPOINTMENT (OUTPATIENT)
Dept: CT IMAGING | Facility: HOSPITAL | Age: 79
DRG: 393 | End: 2019-01-01
Attending: INTERNAL MEDICINE
Payer: MEDICARE

## 2019-01-01 ENCOUNTER — HOSPITAL ENCOUNTER (INPATIENT)
Facility: HOSPITAL | Age: 79
LOS: 1 days | DRG: 643 | End: 2019-01-01
Attending: INTERNAL MEDICINE | Admitting: INTERNAL MEDICINE
Payer: OTHER MISCELLANEOUS

## 2019-01-01 ENCOUNTER — APPOINTMENT (OUTPATIENT)
Dept: GENERAL RADIOLOGY | Facility: HOSPITAL | Age: 79
DRG: 393 | End: 2019-01-01
Attending: EMERGENCY MEDICINE
Payer: MEDICARE

## 2019-01-01 ENCOUNTER — ANESTHESIA (OUTPATIENT)
Dept: ENDOSCOPY | Facility: HOSPITAL | Age: 79
DRG: 394 | End: 2019-01-01
Payer: MEDICARE

## 2019-01-01 ENCOUNTER — HOSPITAL ENCOUNTER (INPATIENT)
Facility: HOSPITAL | Age: 79
LOS: 3 days | Discharge: HOME | DRG: 394 | End: 2019-01-01
Attending: EMERGENCY MEDICINE | Admitting: HOSPITALIST
Payer: MEDICARE

## 2019-01-01 ENCOUNTER — HOSPITAL ENCOUNTER (INPATIENT)
Facility: HOSPITAL | Age: 79
LOS: 5 days | Discharge: NURSING, REHAB OR LONG-TERM CARE | DRG: 392 | End: 2019-01-01
Attending: EMERGENCY MEDICINE | Admitting: HOSPITALIST
Payer: MEDICARE

## 2019-01-01 ENCOUNTER — APPOINTMENT (OUTPATIENT)
Dept: ULTRASOUND IMAGING | Facility: HOSPITAL | Age: 79
DRG: 643 | End: 2019-01-01
Attending: INTERNAL MEDICINE
Payer: MEDICARE

## 2019-01-01 ENCOUNTER — HOSPITAL ENCOUNTER (OUTPATIENT)
Dept: GENERAL RADIOLOGY | Age: 79
Discharge: HOME | End: 2019-01-01
Attending: FAMILY MEDICINE
Payer: MEDICARE

## 2019-01-01 ENCOUNTER — TELEPHONE (OUTPATIENT)
Dept: NEPHROLOGY | Facility: CLINIC | Age: 79
End: 2019-01-01

## 2019-01-01 ENCOUNTER — HOSPITAL ENCOUNTER (OUTPATIENT)
Dept: CT IMAGING | Facility: HOSPITAL | Age: 79
Discharge: HOME | End: 2019-01-01
Attending: SURGERY
Payer: MEDICARE

## 2019-01-01 ENCOUNTER — APPOINTMENT (OUTPATIENT)
Dept: CT IMAGING | Facility: HOSPITAL | Age: 79
DRG: 392 | End: 2019-01-01
Attending: INTERNAL MEDICINE
Payer: MEDICARE

## 2019-01-01 ENCOUNTER — HOSPITAL ENCOUNTER (OUTPATIENT)
Dept: ULTRASOUND IMAGING | Age: 79
Discharge: HOME | End: 2019-01-01
Attending: INTERNAL MEDICINE
Payer: MEDICARE

## 2019-01-01 VITALS
DIASTOLIC BLOOD PRESSURE: 64 MMHG | TEMPERATURE: 98 F | HEART RATE: 88 BPM | OXYGEN SATURATION: 98 % | BODY MASS INDEX: 20.07 KG/M2 | WEIGHT: 113.25 LBS | HEIGHT: 63 IN | SYSTOLIC BLOOD PRESSURE: 108 MMHG

## 2019-01-01 VITALS
RESPIRATION RATE: 18 BRPM | HEART RATE: 82 BPM | OXYGEN SATURATION: 99 % | TEMPERATURE: 98 F | SYSTOLIC BLOOD PRESSURE: 163 MMHG | DIASTOLIC BLOOD PRESSURE: 75 MMHG

## 2019-01-01 VITALS
WEIGHT: 131.75 LBS | DIASTOLIC BLOOD PRESSURE: 74 MMHG | HEART RATE: 84 BPM | RESPIRATION RATE: 16 BRPM | HEIGHT: 63 IN | BODY MASS INDEX: 23.34 KG/M2 | TEMPERATURE: 98 F | SYSTOLIC BLOOD PRESSURE: 126 MMHG

## 2019-01-01 VITALS
HEIGHT: 63 IN | BODY MASS INDEX: 20.02 KG/M2 | TEMPERATURE: 99 F | HEART RATE: 89 BPM | OXYGEN SATURATION: 95 % | DIASTOLIC BLOOD PRESSURE: 84 MMHG | RESPIRATION RATE: 20 BRPM | SYSTOLIC BLOOD PRESSURE: 156 MMHG | WEIGHT: 113 LBS

## 2019-01-01 VITALS
HEIGHT: 63 IN | DIASTOLIC BLOOD PRESSURE: 64 MMHG | RESPIRATION RATE: 20 BRPM | HEART RATE: 106 BPM | OXYGEN SATURATION: 96 % | BODY MASS INDEX: 18.9 KG/M2 | TEMPERATURE: 98 F | WEIGHT: 106.69 LBS | SYSTOLIC BLOOD PRESSURE: 139 MMHG

## 2019-01-01 VITALS
HEART RATE: 65 BPM | OXYGEN SATURATION: 97 % | WEIGHT: 127.88 LBS | RESPIRATION RATE: 16 BRPM | HEIGHT: 63 IN | BODY MASS INDEX: 22.66 KG/M2 | DIASTOLIC BLOOD PRESSURE: 71 MMHG | SYSTOLIC BLOOD PRESSURE: 130 MMHG

## 2019-01-01 VITALS
RESPIRATION RATE: 16 BRPM | HEART RATE: 60 BPM | DIASTOLIC BLOOD PRESSURE: 73 MMHG | WEIGHT: 134 LBS | BODY MASS INDEX: 23.74 KG/M2 | OXYGEN SATURATION: 97 % | TEMPERATURE: 98 F | SYSTOLIC BLOOD PRESSURE: 121 MMHG | HEIGHT: 63 IN

## 2019-01-01 VITALS
SYSTOLIC BLOOD PRESSURE: 108 MMHG | DIASTOLIC BLOOD PRESSURE: 56 MMHG | OXYGEN SATURATION: 98 % | TEMPERATURE: 98 F | WEIGHT: 129 LBS | BODY MASS INDEX: 23 KG/M2 | HEART RATE: 70 BPM

## 2019-01-01 VITALS
BODY MASS INDEX: 21 KG/M2 | OXYGEN SATURATION: 95 % | SYSTOLIC BLOOD PRESSURE: 150 MMHG | HEART RATE: 87 BPM | WEIGHT: 118.63 LBS | RESPIRATION RATE: 18 BRPM | DIASTOLIC BLOOD PRESSURE: 80 MMHG | TEMPERATURE: 98 F

## 2019-01-01 VITALS
DIASTOLIC BLOOD PRESSURE: 55 MMHG | WEIGHT: 130.13 LBS | HEIGHT: 63 IN | TEMPERATURE: 98 F | SYSTOLIC BLOOD PRESSURE: 110 MMHG | HEART RATE: 94 BPM | RESPIRATION RATE: 16 BRPM | OXYGEN SATURATION: 95 % | BODY MASS INDEX: 23.06 KG/M2

## 2019-01-01 VITALS
HEIGHT: 62 IN | OXYGEN SATURATION: 97 % | TEMPERATURE: 98 F | BODY MASS INDEX: 25.03 KG/M2 | DIASTOLIC BLOOD PRESSURE: 53 MMHG | SYSTOLIC BLOOD PRESSURE: 114 MMHG | RESPIRATION RATE: 18 BRPM | WEIGHT: 136 LBS | HEART RATE: 72 BPM

## 2019-01-01 VITALS
HEIGHT: 63 IN | WEIGHT: 120 LBS | HEART RATE: 68 BPM | RESPIRATION RATE: 16 BRPM | HEART RATE: 74 BPM | BODY MASS INDEX: 21.26 KG/M2 | WEIGHT: 114 LBS | SYSTOLIC BLOOD PRESSURE: 110 MMHG | BODY MASS INDEX: 20.2 KG/M2 | SYSTOLIC BLOOD PRESSURE: 122 MMHG | DIASTOLIC BLOOD PRESSURE: 70 MMHG | HEIGHT: 63 IN | TEMPERATURE: 98 F | OXYGEN SATURATION: 99 % | RESPIRATION RATE: 16 BRPM | TEMPERATURE: 98 F | DIASTOLIC BLOOD PRESSURE: 60 MMHG

## 2019-01-01 VITALS
HEART RATE: 120 BPM | OXYGEN SATURATION: 85 % | DIASTOLIC BLOOD PRESSURE: 86 MMHG | RESPIRATION RATE: 20 BRPM | TEMPERATURE: 101 F | SYSTOLIC BLOOD PRESSURE: 142 MMHG

## 2019-01-01 VITALS
BODY MASS INDEX: 20.73 KG/M2 | TEMPERATURE: 98 F | HEART RATE: 67 BPM | RESPIRATION RATE: 12 BRPM | HEIGHT: 63 IN | DIASTOLIC BLOOD PRESSURE: 64 MMHG | OXYGEN SATURATION: 98 % | SYSTOLIC BLOOD PRESSURE: 102 MMHG | WEIGHT: 117 LBS

## 2019-01-01 VITALS
RESPIRATION RATE: 18 BRPM | BODY MASS INDEX: 24 KG/M2 | WEIGHT: 133.5 LBS | TEMPERATURE: 98 F | HEART RATE: 86 BPM | DIASTOLIC BLOOD PRESSURE: 88 MMHG | SYSTOLIC BLOOD PRESSURE: 134 MMHG | OXYGEN SATURATION: 98 %

## 2019-01-01 VITALS — DIASTOLIC BLOOD PRESSURE: 64 MMHG | WEIGHT: 137 LBS | SYSTOLIC BLOOD PRESSURE: 106 MMHG | BODY MASS INDEX: 24 KG/M2

## 2019-01-01 VITALS
RESPIRATION RATE: 16 BRPM | DIASTOLIC BLOOD PRESSURE: 57 MMHG | BODY MASS INDEX: 20.08 KG/M2 | WEIGHT: 113.31 LBS | SYSTOLIC BLOOD PRESSURE: 96 MMHG | HEART RATE: 64 BPM | TEMPERATURE: 98 F | OXYGEN SATURATION: 98 % | HEIGHT: 62.99 IN

## 2019-01-01 VITALS
DIASTOLIC BLOOD PRESSURE: 78 MMHG | TEMPERATURE: 98 F | BODY MASS INDEX: 23.83 KG/M2 | WEIGHT: 134.5 LBS | HEIGHT: 63 IN | SYSTOLIC BLOOD PRESSURE: 138 MMHG | RESPIRATION RATE: 12 BRPM | HEART RATE: 80 BPM

## 2019-01-01 DIAGNOSIS — Z48.02 VISIT FOR SUTURE REMOVAL: Primary | ICD-10-CM

## 2019-01-01 DIAGNOSIS — E78.00 HYPERCHOLESTEROLEMIA: ICD-10-CM

## 2019-01-01 DIAGNOSIS — R19.7 DIARRHEA, UNSPECIFIED TYPE: Primary | ICD-10-CM

## 2019-01-01 DIAGNOSIS — R19.7 DIARRHEA: ICD-10-CM

## 2019-01-01 DIAGNOSIS — R79.89 ELEVATED PTHRP LEVEL: ICD-10-CM

## 2019-01-01 DIAGNOSIS — Z94.0 RENAL TRANSPLANT RECIPIENT: ICD-10-CM

## 2019-01-01 DIAGNOSIS — D63.1 ANEMIA OF CHRONIC RENAL FAILURE, STAGE 3 (MODERATE) (HCC): ICD-10-CM

## 2019-01-01 DIAGNOSIS — E87.1 HYPONATREMIA: Primary | ICD-10-CM

## 2019-01-01 DIAGNOSIS — T82.838A BLEEDING PSEUDOANEURYSM OF LEFT BRACHIOCEPHALIC ARTERIOVENOUS FISTULA (HCC): ICD-10-CM

## 2019-01-01 DIAGNOSIS — R53.1 WEAKNESS: ICD-10-CM

## 2019-01-01 DIAGNOSIS — Z02.9 ENCOUNTERS FOR ADMINISTRATIVE PURPOSE: ICD-10-CM

## 2019-01-01 DIAGNOSIS — Z00.00 MEDICARE ANNUAL WELLNESS VISIT, SUBSEQUENT: Primary | ICD-10-CM

## 2019-01-01 DIAGNOSIS — N30.00 ACUTE CYSTITIS WITHOUT HEMATURIA: ICD-10-CM

## 2019-01-01 DIAGNOSIS — R19.7 DIARRHEA, UNSPECIFIED TYPE: ICD-10-CM

## 2019-01-01 DIAGNOSIS — M80.00XS AGE-RELATED OSTEOPOROSIS WITH CURRENT PATHOLOGICAL FRACTURE, SEQUELA: Primary | ICD-10-CM
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
## 2019-01-01 DIAGNOSIS — I72.9 PSEUDOANEURYSM (HCC): ICD-10-CM

## 2019-01-01 DIAGNOSIS — R19.7 DIARRHEA OF PRESUMED INFECTIOUS ORIGIN: Primary | ICD-10-CM

## 2019-01-01 DIAGNOSIS — Z00.00 ENCOUNTER FOR ANNUAL HEALTH EXAMINATION: ICD-10-CM

## 2019-01-01 DIAGNOSIS — Z94.0 KIDNEY TRANSPLANTED: ICD-10-CM

## 2019-01-01 DIAGNOSIS — Q61.3 PKD (POLYCYSTIC KIDNEY DISEASE): ICD-10-CM

## 2019-01-01 DIAGNOSIS — M81.0 AGE-RELATED OSTEOPOROSIS WITHOUT CURRENT PATHOLOGICAL FRACTURE: ICD-10-CM

## 2019-01-01 DIAGNOSIS — Z94.0 KIDNEY TRANSPLANT RECIPIENT: ICD-10-CM

## 2019-01-01 DIAGNOSIS — I10 ESSENTIAL HYPERTENSION: ICD-10-CM

## 2019-01-01 DIAGNOSIS — R79.89 AZOTEMIA: ICD-10-CM

## 2019-01-01 DIAGNOSIS — Z02.9 ENCOUNTERS FOR UNSPECIFIED ADMINISTRATIVE PURPOSE: ICD-10-CM

## 2019-01-01 DIAGNOSIS — K76.89 LIVER CYST: ICD-10-CM

## 2019-01-01 DIAGNOSIS — R11.10 VOMITING, INTRACTABILITY OF VOMITING NOT SPECIFIED, PRESENCE OF NAUSEA NOT SPECIFIED, UNSPECIFIED VOMITING TYPE: ICD-10-CM

## 2019-01-01 DIAGNOSIS — M54.50 CHRONIC LOW BACK PAIN WITHOUT SCIATICA, UNSPECIFIED BACK PAIN LATERALITY: ICD-10-CM

## 2019-01-01 DIAGNOSIS — Z51.89 VISIT FOR WOUND CHECK: Primary | ICD-10-CM

## 2019-01-01 DIAGNOSIS — R73.09 ELEVATED GLUCOSE: ICD-10-CM

## 2019-01-01 DIAGNOSIS — E87.2 METABOLIC ACIDOSIS: Primary | ICD-10-CM

## 2019-01-01 DIAGNOSIS — N18.6 END-STAGE RENAL DISEASE (HCC): ICD-10-CM

## 2019-01-01 DIAGNOSIS — R53.1 WEAKNESS GENERALIZED: Primary | ICD-10-CM

## 2019-01-01 DIAGNOSIS — R53.1 WEAKNESS GENERALIZED: ICD-10-CM

## 2019-01-01 DIAGNOSIS — M79.602 LEFT ARM PAIN: Primary | ICD-10-CM

## 2019-01-01 DIAGNOSIS — E87.1 HYPONATREMIA: ICD-10-CM

## 2019-01-01 DIAGNOSIS — A08.4 VIRAL GASTROENTERITIS: Primary | ICD-10-CM

## 2019-01-01 DIAGNOSIS — M80.00XS AGE-RELATED OSTEOPOROSIS WITH CURRENT PATHOLOGICAL FRACTURE, SEQUELA: ICD-10-CM

## 2019-01-01 DIAGNOSIS — E86.0 DEHYDRATION: ICD-10-CM

## 2019-01-01 DIAGNOSIS — N25.81 HYPERPARATHYROIDISM DUE TO RENAL INSUFFICIENCY (HCC): ICD-10-CM

## 2019-01-01 DIAGNOSIS — R19.7 DIARRHEA OF PRESUMED INFECTIOUS ORIGIN: ICD-10-CM

## 2019-01-01 DIAGNOSIS — E86.0 DEHYDRATION: Primary | ICD-10-CM

## 2019-01-01 DIAGNOSIS — R50.9 FEVER, UNSPECIFIED FEVER CAUSE: Primary | ICD-10-CM

## 2019-01-01 DIAGNOSIS — R06.00 DYSPNEA, UNSPECIFIED TYPE: ICD-10-CM

## 2019-01-01 DIAGNOSIS — E87.70 HYPERVOLEMIA, UNSPECIFIED HYPERVOLEMIA TYPE: ICD-10-CM

## 2019-01-01 DIAGNOSIS — K52.9 CHRONIC DIARRHEA: ICD-10-CM

## 2019-01-01 DIAGNOSIS — I71.4 ABDOMINAL AORTIC ANEURYSM (AAA) WITHOUT RUPTURE (HCC): ICD-10-CM

## 2019-01-01 DIAGNOSIS — Z94.0 KIDNEY TRANSPLANT RECIPIENT: Primary | ICD-10-CM

## 2019-01-01 DIAGNOSIS — Z71.89 GOALS OF CARE, COUNSELING/DISCUSSION: ICD-10-CM

## 2019-01-01 DIAGNOSIS — I25.10 CORONARY ARTERIOSCLEROSIS: ICD-10-CM

## 2019-01-01 DIAGNOSIS — N18.30 ANEMIA OF CHRONIC RENAL FAILURE, STAGE 3 (MODERATE) (HCC): ICD-10-CM

## 2019-01-01 DIAGNOSIS — R11.2 NAUSEA AND VOMITING IN ADULT: ICD-10-CM

## 2019-01-01 DIAGNOSIS — K52.9 GASTROENTERITIS: Primary | ICD-10-CM

## 2019-01-01 DIAGNOSIS — Q61.3 POLYCYSTIC KIDNEY DISEASE: ICD-10-CM

## 2019-01-01 DIAGNOSIS — G89.29 CHRONIC LOW BACK PAIN WITHOUT SCIATICA, UNSPECIFIED BACK PAIN LATERALITY: ICD-10-CM

## 2019-01-01 DIAGNOSIS — I71.4 ENLARGING ABDOMINAL AORTIC ANEURYSM (AAA) (HCC): ICD-10-CM

## 2019-01-01 DIAGNOSIS — N39.0 CHRONIC UTI: ICD-10-CM

## 2019-01-01 DIAGNOSIS — I25.10 CORONARY ARTERY DISEASE INVOLVING NATIVE CORONARY ARTERY OF NATIVE HEART WITHOUT ANGINA PECTORIS: ICD-10-CM

## 2019-01-01 DIAGNOSIS — T86.10 RENAL TRANSPLANT DISORDER: Primary | ICD-10-CM

## 2019-01-01 DIAGNOSIS — E43 SEVERE PROTEIN-CALORIE MALNUTRITION (HCC): ICD-10-CM

## 2019-01-01 DIAGNOSIS — H40.9 GLAUCOMA, UNSPECIFIED GLAUCOMA TYPE, UNSPECIFIED LATERALITY: ICD-10-CM

## 2019-01-01 DIAGNOSIS — M79.602 LEFT ARM PAIN: ICD-10-CM

## 2019-01-01 LAB
ADENOVIRUS F 40/41 PCR: NEGATIVE
ADENOVIRUS PCR:: NEGATIVE
ALBUMIN SERPL-MCNC: 2.4 G/DL (ref 3.4–5)
ALBUMIN SERPL-MCNC: 2.5 G/DL (ref 3.4–5)
ALBUMIN SERPL-MCNC: 2.5 G/DL (ref 3.4–5)
ALBUMIN SERPL-MCNC: 2.6 G/DL (ref 3.4–5)
ALBUMIN SERPL-MCNC: 2.7 G/DL (ref 3.4–5)
ALBUMIN SERPL-MCNC: 2.7 G/DL (ref 3.4–5)
ALBUMIN SERPL-MCNC: 2.8 G/DL (ref 3.4–5)
ALBUMIN SERPL-MCNC: 2.8 G/DL (ref 3.4–5)
ALBUMIN SERPL-MCNC: 3.5 G/DL (ref 3.4–5)
ALBUMIN/GLOB SERPL: 0.7 {RATIO} (ref 1–2)
ALBUMIN/GLOB SERPL: 0.7 {RATIO} (ref 1–2)
ALBUMIN/GLOB SERPL: 0.8 {RATIO} (ref 1–2)
ALBUMIN/GLOB SERPL: 0.9 {RATIO} (ref 1–2)
ALBUMIN/GLOB SERPL: 1 {RATIO} (ref 1–2)
ALBUMIN/GLOB SERPL: 1.1 {RATIO} (ref 1–2)
ALBUMIN/GLOB SERPL: 1.3 {RATIO} (ref 1–2)
ALP LIVER SERPL-CCNC: 48 U/L (ref 55–142)
ALP LIVER SERPL-CCNC: 48 U/L (ref 55–142)
ALP LIVER SERPL-CCNC: 56 U/L (ref 55–142)
ALP LIVER SERPL-CCNC: 58 U/L (ref 55–142)
ALP LIVER SERPL-CCNC: 59 U/L (ref 55–142)
ALP LIVER SERPL-CCNC: 63 U/L (ref 55–142)
ALP LIVER SERPL-CCNC: 69 U/L (ref 55–142)
ALP LIVER SERPL-CCNC: 83 U/L (ref 55–142)
ALP LIVER SERPL-CCNC: 90 U/L (ref 55–142)
ALT SERPL-CCNC: 17 U/L (ref 13–56)
ALT SERPL-CCNC: 25 U/L (ref 13–56)
ALT SERPL-CCNC: 26 U/L (ref 13–56)
ALT SERPL-CCNC: 27 U/L (ref 13–56)
ALT SERPL-CCNC: 6 U/L (ref 13–56)
ALT SERPL-CCNC: 6 U/L (ref 13–56)
ALT SERPL-CCNC: 8 U/L (ref 13–56)
ALT SERPL-CCNC: 9 U/L (ref 13–56)
ALT SERPL-CCNC: <6 U/L (ref 13–56)
ANION GAP SERPL CALC-SCNC: 10 MMOL/L (ref 0–18)
ANION GAP SERPL CALC-SCNC: 10 MMOL/L (ref 0–18)
ANION GAP SERPL CALC-SCNC: 4 MMOL/L (ref 0–18)
ANION GAP SERPL CALC-SCNC: 5 MMOL/L (ref 0–18)
ANION GAP SERPL CALC-SCNC: 6 MMOL/L (ref 0–18)
ANION GAP SERPL CALC-SCNC: 7 MMOL/L (ref 0–18)
ANION GAP SERPL CALC-SCNC: 8 MMOL/L (ref 0–18)
ANION GAP SERPL CALC-SCNC: 9 MMOL/L (ref 0–18)
ANION GAP SERPL CALC-SCNC: 9 MMOL/L (ref 0–18)
AST SERPL-CCNC: 13 U/L (ref 15–37)
AST SERPL-CCNC: 15 U/L (ref 15–37)
AST SERPL-CCNC: 16 U/L (ref 15–37)
AST SERPL-CCNC: 22 U/L (ref 15–37)
AST SERPL-CCNC: 24 U/L (ref 15–37)
AST SERPL-CCNC: 5 U/L (ref 15–37)
AST SERPL-CCNC: 8 U/L (ref 15–37)
AST SERPL-CCNC: 8 U/L (ref 15–37)
AST SERPL-CCNC: 9 U/L (ref 15–37)
ASTROVIRUS PCR: NEGATIVE
ATRIAL RATE: 103 BPM
ATRIAL RATE: 70 BPM
ATRIAL RATE: 77 BPM
ATRIAL RATE: 80 BPM
ATRIAL RATE: 83 BPM
B PERT DNA SPEC QL NAA+PROBE: NEGATIVE
BASOPHILS # BLD AUTO: 0.02 X10(3) UL (ref 0–0.2)
BASOPHILS # BLD AUTO: 0.03 X10(3) UL (ref 0–0.2)
BASOPHILS # BLD AUTO: 0.04 X10(3) UL (ref 0–0.2)
BASOPHILS # BLD AUTO: 0.05 X10(3) UL (ref 0–0.2)
BASOPHILS NFR BLD AUTO: 0.1 %
BASOPHILS NFR BLD AUTO: 0.2 %
BASOPHILS NFR BLD AUTO: 0.3 %
BASOPHILS NFR BLD AUTO: 0.4 %
BASOPHILS NFR BLD AUTO: 0.6 %
BASOPHILS NFR BLD AUTO: 0.7 %
BASOPHILS NFR BLD AUTO: 0.7 %
BASOPHILS NFR BLD AUTO: 0.8 %
BASOPHILS NFR BLD AUTO: 0.9 %
BILIRUB SERPL-MCNC: 0.5 MG/DL (ref 0.1–2)
BILIRUB SERPL-MCNC: 0.6 MG/DL (ref 0.1–2)
BILIRUB SERPL-MCNC: 0.6 MG/DL (ref 0.1–2)
BILIRUB SERPL-MCNC: 0.7 MG/DL (ref 0.1–2)
BILIRUB SERPL-MCNC: 0.7 MG/DL (ref 0.1–2)
BILIRUB SERPL-MCNC: 0.8 MG/DL (ref 0.1–2)
BILIRUB SERPL-MCNC: 0.9 MG/DL (ref 0.1–2)
BILIRUB UR QL STRIP.AUTO: NEGATIVE
BUN BLD-MCNC: 10 MG/DL (ref 7–18)
BUN BLD-MCNC: 12 MG/DL (ref 7–18)
BUN BLD-MCNC: 12 MG/DL (ref 7–18)
BUN BLD-MCNC: 13 MG/DL (ref 7–18)
BUN BLD-MCNC: 14 MG/DL (ref 7–18)
BUN BLD-MCNC: 16 MG/DL (ref 7–18)
BUN BLD-MCNC: 17 MG/DL (ref 7–18)
BUN BLD-MCNC: 17 MG/DL (ref 7–18)
BUN BLD-MCNC: 18 MG/DL (ref 7–18)
BUN BLD-MCNC: 18 MG/DL (ref 7–18)
BUN BLD-MCNC: 19 MG/DL (ref 7–18)
BUN BLD-MCNC: 19 MG/DL (ref 7–18)
BUN BLD-MCNC: 21 MG/DL (ref 7–18)
BUN BLD-MCNC: 22 MG/DL (ref 7–18)
BUN BLD-MCNC: 23 MG/DL (ref 7–18)
BUN BLD-MCNC: 8 MG/DL (ref 7–18)
BUN BLD-MCNC: 9 MG/DL (ref 7–18)
BUN/CREAT SERPL: 14.8 (ref 10–20)
BUN/CREAT SERPL: 19.1 (ref 10–20)
BUN/CREAT SERPL: 19.2 (ref 10–20)
BUN/CREAT SERPL: 21.8 (ref 10–20)
BUN/CREAT SERPL: 22 (ref 10–20)
BUN/CREAT SERPL: 22.2 (ref 10–20)
BUN/CREAT SERPL: 22.2 (ref 10–20)
BUN/CREAT SERPL: 22.6 (ref 10–20)
BUN/CREAT SERPL: 23.9 (ref 10–20)
BUN/CREAT SERPL: 24 (ref 10–20)
BUN/CREAT SERPL: 25.5 (ref 10–20)
BUN/CREAT SERPL: 25.7 (ref 10–20)
BUN/CREAT SERPL: 26.3 (ref 10–20)
BUN/CREAT SERPL: 26.5 (ref 10–20)
BUN/CREAT SERPL: 27.1 (ref 10–20)
BUN/CREAT SERPL: 27.4 (ref 10–20)
BUN/CREAT SERPL: 27.6 (ref 10–20)
BUN/CREAT SERPL: 27.7 (ref 10–20)
BUN/CREAT SERPL: 28 (ref 10–20)
BUN/CREAT SERPL: 28 (ref 10–20)
BUN/CREAT SERPL: 28.6 (ref 10–20)
BUN/CREAT SERPL: 29.6 (ref 10–20)
BUN/CREAT SERPL: 29.6 (ref 10–20)
BUN/CREAT SERPL: 30.2 (ref 10–20)
BUN/CREAT SERPL: 30.4 (ref 10–20)
BUN/CREAT SERPL: 30.8 (ref 10–20)
BUN/CREAT SERPL: 31.1 (ref 10–20)
BUN/CREAT SERPL: 33.3 (ref 10–20)
BUN/CREAT SERPL: 36 (ref 10–20)
BUN/CREAT SERPL: 37.9 (ref 10–20)
C CAYETANENSIS DNA SPEC QL NAA+PROBE: NEGATIVE
C DIFF TOX B STL QL: NEGATIVE
C PNEUM DNA SPEC QL NAA+PROBE: NEGATIVE
C. DIFFICILE TOXIN A/B PCR: NEGATIVE
CALCIUM BLD-MCNC: 7.8 MG/DL (ref 8.5–10.1)
CALCIUM BLD-MCNC: 7.8 MG/DL (ref 8.5–10.1)
CALCIUM BLD-MCNC: 7.9 MG/DL (ref 8.5–10.1)
CALCIUM BLD-MCNC: 7.9 MG/DL (ref 8.5–10.1)
CALCIUM BLD-MCNC: 8 MG/DL (ref 8.5–10.1)
CALCIUM BLD-MCNC: 8.1 MG/DL (ref 8.5–10.1)
CALCIUM BLD-MCNC: 8.3 MG/DL (ref 8.5–10.1)
CALCIUM BLD-MCNC: 8.5 MG/DL (ref 8.5–10.1)
CALCIUM BLD-MCNC: 8.6 MG/DL (ref 8.5–10.1)
CALCIUM BLD-MCNC: 8.7 MG/DL (ref 8.5–10.1)
CALCIUM BLD-MCNC: 8.7 MG/DL (ref 8.5–10.1)
CALCIUM BLD-MCNC: 9.1 MG/DL (ref 8.5–10.1)
CAMPY SP DNA.DIARRHEA STL QL NAA+PROBE: NEGATIVE
CHLORIDE SERPL-SCNC: 100 MMOL/L (ref 98–112)
CHLORIDE SERPL-SCNC: 101 MMOL/L (ref 98–112)
CHLORIDE SERPL-SCNC: 102 MMOL/L (ref 98–112)
CHLORIDE SERPL-SCNC: 103 MMOL/L (ref 98–112)
CHLORIDE SERPL-SCNC: 111 MMOL/L (ref 98–107)
CHLORIDE SERPL-SCNC: 86 MMOL/L (ref 98–112)
CHLORIDE SERPL-SCNC: 86 MMOL/L (ref 98–112)
CHLORIDE SERPL-SCNC: 88 MMOL/L (ref 98–112)
CHLORIDE SERPL-SCNC: 89 MMOL/L (ref 98–112)
CHLORIDE SERPL-SCNC: 90 MMOL/L (ref 98–112)
CHLORIDE SERPL-SCNC: 91 MMOL/L (ref 98–112)
CHLORIDE SERPL-SCNC: 91 MMOL/L (ref 98–112)
CHLORIDE SERPL-SCNC: 95 MMOL/L (ref 98–112)
CHLORIDE SERPL-SCNC: 96 MMOL/L (ref 98–112)
CHLORIDE SERPL-SCNC: 96 MMOL/L (ref 98–112)
CHLORIDE SERPL-SCNC: 97 MMOL/L (ref 98–112)
CHLORIDE SERPL-SCNC: 97 MMOL/L (ref 98–112)
CHLORIDE SERPL-SCNC: 99 MMOL/L (ref 98–112)
CHLORIDE SERPL-SCNC: 99 MMOL/L (ref 98–112)
CHLORIDE, STOOL: 56 MMOL/L
CLARITY UR REFRACT.AUTO: CLEAR
CMV QUANT BY PCR, CPY/ML: <390 CPY/ML
CMV QUANT BY PCR, INTERP: NOT DETECTED
CMV QUANT BY PCR, IU/ML: <227 IU/ML
CMV QUANT BY PCR, LOG CPY/ML: <2.6 LOG CPY/ML
CMV QUANT BY PCR, LOG IU/ML: <2.4 LOG IU/ML
CO2 SERPL-SCNC: 21 MMOL/L (ref 21–32)
CO2 SERPL-SCNC: 22 MMOL/L (ref 21–32)
CO2 SERPL-SCNC: 22 MMOL/L (ref 21–32)
CO2 SERPL-SCNC: 23 MMOL/L (ref 21–32)
CO2 SERPL-SCNC: 24 MMOL/L (ref 21–32)
CO2 SERPL-SCNC: 25 MMOL/L (ref 21–32)
CO2 SERPL-SCNC: 26 MMOL/L (ref 21–32)
CO2 SERPL-SCNC: 26 MMOL/L (ref 21–32)
CO2 SERPL-SCNC: 27 MMOL/L (ref 21–32)
CO2 SERPL-SCNC: 27 MMOL/L (ref 21–32)
CO2 SERPL-SCNC: 28 MMOL/L (ref 21–32)
CO2 SERPL-SCNC: 28 MMOL/L (ref 21–32)
CO2 SERPL-SCNC: 29 MMOL/L (ref 21–32)
COLOR UR AUTO: YELLOW
CORONAVIRUS 229E PCR:: NEGATIVE
CORONAVIRUS HKU1 PCR:: NEGATIVE
CORONAVIRUS NL63 PCR:: NEGATIVE
CORONAVIRUS OC43 PCR:: NEGATIVE
CORTIS SERPL-MCNC: 11.9 UG/DL
CREAT BLD-MCNC: 0.47 MG/DL (ref 0.55–1.02)
CREAT BLD-MCNC: 0.48 MG/DL (ref 0.55–1.02)
CREAT BLD-MCNC: 0.49 MG/DL (ref 0.55–1.02)
CREAT BLD-MCNC: 0.5 MG/DL (ref 0.55–1.02)
CREAT BLD-MCNC: 0.52 MG/DL (ref 0.55–1.02)
CREAT BLD-MCNC: 0.52 MG/DL (ref 0.55–1.02)
CREAT BLD-MCNC: 0.53 MG/DL (ref 0.55–1.02)
CREAT BLD-MCNC: 0.53 MG/DL (ref 0.55–1.02)
CREAT BLD-MCNC: 0.54 MG/DL (ref 0.55–1.02)
CREAT BLD-MCNC: 0.54 MG/DL (ref 0.55–1.02)
CREAT BLD-MCNC: 0.55 MG/DL (ref 0.55–1.02)
CREAT BLD-MCNC: 0.55 MG/DL (ref 0.55–1.02)
CREAT BLD-MCNC: 0.56 MG/DL (ref 0.55–1.02)
CREAT BLD-MCNC: 0.56 MG/DL (ref 0.55–1.02)
CREAT BLD-MCNC: 0.58 MG/DL (ref 0.55–1.02)
CREAT BLD-MCNC: 0.58 MG/DL (ref 0.55–1.02)
CREAT BLD-MCNC: 0.59 MG/DL (ref 0.55–1.02)
CREAT BLD-MCNC: 0.61 MG/DL (ref 0.55–1.02)
CREAT BLD-MCNC: 0.62 MG/DL (ref 0.55–1.02)
CREAT BLD-MCNC: 0.63 MG/DL (ref 0.55–1.02)
CREAT BLD-MCNC: 0.63 MG/DL (ref 0.55–1.02)
CREAT BLD-MCNC: 0.71 MG/DL (ref 0.55–1.02)
CREAT BLD-MCNC: 0.72 MG/DL (ref 0.55–1.02)
CREAT BLD-MCNC: 0.74 MG/DL (ref 0.55–1.02)
CREAT BLD-MCNC: 0.75 MG/DL (ref 0.55–1.02)
CREAT BLD-MCNC: 0.8 MG/DL (ref 0.55–1.02)
CREAT BLD-MCNC: 0.83 MG/DL (ref 0.55–1.02)
CREAT BLD-MCNC: 0.88 MG/DL (ref 0.55–1.02)
CREAT UR-SCNC: 57.1 MG/DL
CRYPTOSP DNA SPEC QL NAA+PROBE: NEGATIVE
DEPRECATED HBV CORE AB SER IA-ACNC: 435.8 NG/ML (ref 18–340)
DEPRECATED RDW RBC AUTO: 48.9 FL (ref 35.1–46.3)
DEPRECATED RDW RBC AUTO: 49.4 FL (ref 35.1–46.3)
DEPRECATED RDW RBC AUTO: 49.5 FL (ref 35.1–46.3)
DEPRECATED RDW RBC AUTO: 50.5 FL (ref 35.1–46.3)
DEPRECATED RDW RBC AUTO: 50.9 FL (ref 35.1–46.3)
DEPRECATED RDW RBC AUTO: 51.9 FL (ref 35.1–46.3)
DEPRECATED RDW RBC AUTO: 52.5 FL (ref 35.1–46.3)
DEPRECATED RDW RBC AUTO: 56.6 FL (ref 35.1–46.3)
DEPRECATED RDW RBC AUTO: 56.8 FL (ref 35.1–46.3)
DEPRECATED RDW RBC AUTO: 57.1 FL (ref 35.1–46.3)
DEPRECATED RDW RBC AUTO: 58 FL (ref 35.1–46.3)
DEPRECATED RDW RBC AUTO: 59.4 FL (ref 35.1–46.3)
DEPRECATED RDW RBC AUTO: 60 FL (ref 35.1–46.3)
DEPRECATED RDW RBC AUTO: 60.9 FL (ref 35.1–46.3)
E COLI DNA BLD POS QL NAA+NON-PROBE: DETECTED
EAEC PAA PLAS AGGR+AATA ST NAA+NON-PRB: NEGATIVE
EC STX1+STX2 + H7 FLIC SPEC NAA+PROBE: NEGATIVE
ENTAMOEBA HISTOLYTICA PCR: NEGATIVE
EOSINOPHIL # BLD AUTO: 0.01 X10(3) UL (ref 0–0.7)
EOSINOPHIL # BLD AUTO: 0.02 X10(3) UL (ref 0–0.7)
EOSINOPHIL # BLD AUTO: 0.02 X10(3) UL (ref 0–0.7)
EOSINOPHIL # BLD AUTO: 0.03 X10(3) UL (ref 0–0.7)
EOSINOPHIL # BLD AUTO: 0.03 X10(3) UL (ref 0–0.7)
EOSINOPHIL # BLD AUTO: 0.04 X10(3) UL (ref 0–0.7)
EOSINOPHIL # BLD AUTO: 0.06 X10(3) UL (ref 0–0.7)
EOSINOPHIL # BLD AUTO: 0.07 X10(3) UL (ref 0–0.7)
EOSINOPHIL # BLD AUTO: 0.08 X10(3) UL (ref 0–0.7)
EOSINOPHIL # BLD AUTO: 0.08 X10(3) UL (ref 0–0.7)
EOSINOPHIL # BLD AUTO: 0.09 X10(3) UL (ref 0–0.7)
EOSINOPHIL NFR BLD AUTO: 0.1 %
EOSINOPHIL NFR BLD AUTO: 0.2 %
EOSINOPHIL NFR BLD AUTO: 0.2 %
EOSINOPHIL NFR BLD AUTO: 0.4 %
EOSINOPHIL NFR BLD AUTO: 0.6 %
EOSINOPHIL NFR BLD AUTO: 0.6 %
EOSINOPHIL NFR BLD AUTO: 0.7 %
EOSINOPHIL NFR BLD AUTO: 0.7 %
EOSINOPHIL NFR BLD AUTO: 0.8 %
EOSINOPHIL NFR BLD AUTO: 2 %
EPEC EAE GENE STL QL NAA+NON-PROBE: NEGATIVE
ERYTHROCYTE [DISTWIDTH] IN BLOOD BY AUTOMATED COUNT: 15.1 % (ref 11–15)
ERYTHROCYTE [DISTWIDTH] IN BLOOD BY AUTOMATED COUNT: 15.6 % (ref 11–15)
ERYTHROCYTE [DISTWIDTH] IN BLOOD BY AUTOMATED COUNT: 15.7 % (ref 11–15)
ERYTHROCYTE [DISTWIDTH] IN BLOOD BY AUTOMATED COUNT: 15.8 % (ref 11–15)
ERYTHROCYTE [DISTWIDTH] IN BLOOD BY AUTOMATED COUNT: 15.9 % (ref 11–15)
ERYTHROCYTE [DISTWIDTH] IN BLOOD BY AUTOMATED COUNT: 16.3 % (ref 11–15)
ERYTHROCYTE [DISTWIDTH] IN BLOOD BY AUTOMATED COUNT: 16.5 % (ref 11–15)
ERYTHROCYTE [DISTWIDTH] IN BLOOD BY AUTOMATED COUNT: 17.7 % (ref 11–15)
ERYTHROCYTE [DISTWIDTH] IN BLOOD BY AUTOMATED COUNT: 17.8 % (ref 11–15)
ERYTHROCYTE [DISTWIDTH] IN BLOOD BY AUTOMATED COUNT: 17.8 % (ref 11–15)
ERYTHROCYTE [DISTWIDTH] IN BLOOD BY AUTOMATED COUNT: 17.9 % (ref 11–15)
ERYTHROCYTE [DISTWIDTH] IN BLOOD BY AUTOMATED COUNT: 18 % (ref 11–15)
ERYTHROCYTE [DISTWIDTH] IN BLOOD BY AUTOMATED COUNT: 18 % (ref 11–15)
ERYTHROCYTE [DISTWIDTH] IN BLOOD BY AUTOMATED COUNT: 18.1 % (ref 11–15)
EST. AVERAGE GLUCOSE BLD GHB EST-MCNC: 151 MG/DL (ref 68–126)
ETEC LTA+ST1A+ST1B TOX ST NAA+NON-PROBE: NEGATIVE
FLUAV RNA SPEC QL NAA+PROBE: NEGATIVE
FLUBV RNA SPEC QL NAA+PROBE: NEGATIVE
GIARDIA LAMBLIA PCR: NEGATIVE
GLOBULIN PLAS-MCNC: 2.5 G/DL (ref 2.8–4.4)
GLOBULIN PLAS-MCNC: 2.7 G/DL (ref 2.8–4.4)
GLOBULIN PLAS-MCNC: 2.8 G/DL (ref 2.8–4.4)
GLOBULIN PLAS-MCNC: 2.9 G/DL (ref 2.8–4.4)
GLOBULIN PLAS-MCNC: 3.2 G/DL (ref 2.8–4.4)
GLOBULIN PLAS-MCNC: 3.4 G/DL (ref 2.8–4.4)
GLOBULIN PLAS-MCNC: 3.5 G/DL (ref 2.8–4.4)
GLUCOSE BLD-MCNC: 100 MG/DL (ref 70–99)
GLUCOSE BLD-MCNC: 101 MG/DL (ref 70–99)
GLUCOSE BLD-MCNC: 102 MG/DL (ref 70–99)
GLUCOSE BLD-MCNC: 102 MG/DL (ref 70–99)
GLUCOSE BLD-MCNC: 103 MG/DL (ref 70–99)
GLUCOSE BLD-MCNC: 103 MG/DL (ref 70–99)
GLUCOSE BLD-MCNC: 104 MG/DL (ref 70–99)
GLUCOSE BLD-MCNC: 105 MG/DL (ref 70–99)
GLUCOSE BLD-MCNC: 106 MG/DL (ref 70–99)
GLUCOSE BLD-MCNC: 106 MG/DL (ref 70–99)
GLUCOSE BLD-MCNC: 107 MG/DL (ref 70–99)
GLUCOSE BLD-MCNC: 108 MG/DL (ref 70–99)
GLUCOSE BLD-MCNC: 108 MG/DL (ref 70–99)
GLUCOSE BLD-MCNC: 109 MG/DL (ref 70–99)
GLUCOSE BLD-MCNC: 110 MG/DL (ref 70–99)
GLUCOSE BLD-MCNC: 114 MG/DL (ref 70–99)
GLUCOSE BLD-MCNC: 116 MG/DL (ref 70–99)
GLUCOSE BLD-MCNC: 116 MG/DL (ref 70–99)
GLUCOSE BLD-MCNC: 117 MG/DL (ref 70–99)
GLUCOSE BLD-MCNC: 118 MG/DL (ref 70–99)
GLUCOSE BLD-MCNC: 121 MG/DL (ref 70–99)
GLUCOSE BLD-MCNC: 122 MG/DL (ref 70–99)
GLUCOSE BLD-MCNC: 123 MG/DL (ref 70–99)
GLUCOSE BLD-MCNC: 127 MG/DL (ref 70–99)
GLUCOSE BLD-MCNC: 127 MG/DL (ref 70–99)
GLUCOSE BLD-MCNC: 128 MG/DL (ref 70–99)
GLUCOSE BLD-MCNC: 130 MG/DL (ref 70–99)
GLUCOSE BLD-MCNC: 132 MG/DL (ref 70–99)
GLUCOSE BLD-MCNC: 133 MG/DL (ref 70–99)
GLUCOSE BLD-MCNC: 133 MG/DL (ref 70–99)
GLUCOSE BLD-MCNC: 134 MG/DL (ref 70–99)
GLUCOSE BLD-MCNC: 137 MG/DL (ref 70–99)
GLUCOSE BLD-MCNC: 142 MG/DL (ref 70–99)
GLUCOSE BLD-MCNC: 149 MG/DL (ref 70–99)
GLUCOSE BLD-MCNC: 150 MG/DL (ref 70–99)
GLUCOSE BLD-MCNC: 150 MG/DL (ref 70–99)
GLUCOSE BLD-MCNC: 152 MG/DL (ref 70–99)
GLUCOSE BLD-MCNC: 156 MG/DL (ref 70–99)
GLUCOSE BLD-MCNC: 157 MG/DL (ref 70–99)
GLUCOSE BLD-MCNC: 164 MG/DL (ref 70–99)
GLUCOSE BLD-MCNC: 165 MG/DL (ref 70–99)
GLUCOSE BLD-MCNC: 166 MG/DL (ref 70–99)
GLUCOSE BLD-MCNC: 169 MG/DL (ref 70–99)
GLUCOSE BLD-MCNC: 171 MG/DL (ref 70–99)
GLUCOSE BLD-MCNC: 174 MG/DL (ref 70–99)
GLUCOSE BLD-MCNC: 181 MG/DL (ref 70–99)
GLUCOSE BLD-MCNC: 181 MG/DL (ref 70–99)
GLUCOSE BLD-MCNC: 186 MG/DL (ref 70–99)
GLUCOSE BLD-MCNC: 186 MG/DL (ref 70–99)
GLUCOSE BLD-MCNC: 191 MG/DL (ref 70–99)
GLUCOSE BLD-MCNC: 195 MG/DL (ref 70–99)
GLUCOSE BLD-MCNC: 204 MG/DL (ref 70–99)
GLUCOSE BLD-MCNC: 208 MG/DL (ref 70–99)
GLUCOSE BLD-MCNC: 221 MG/DL (ref 70–99)
GLUCOSE BLD-MCNC: 223 MG/DL (ref 70–99)
GLUCOSE BLD-MCNC: 229 MG/DL (ref 70–99)
GLUCOSE BLD-MCNC: 231 MG/DL (ref 70–99)
GLUCOSE BLD-MCNC: 235 MG/DL (ref 70–99)
GLUCOSE BLD-MCNC: 275 MG/DL (ref 70–99)
GLUCOSE BLD-MCNC: 399 MG/DL (ref 70–99)
GLUCOSE BLD-MCNC: 73 MG/DL (ref 70–99)
GLUCOSE BLD-MCNC: 82 MG/DL (ref 70–99)
GLUCOSE BLD-MCNC: 83 MG/DL (ref 70–99)
GLUCOSE BLD-MCNC: 87 MG/DL (ref 70–99)
GLUCOSE BLD-MCNC: 89 MG/DL (ref 70–99)
GLUCOSE BLD-MCNC: 91 MG/DL (ref 70–99)
GLUCOSE BLD-MCNC: 92 MG/DL (ref 70–99)
GLUCOSE BLD-MCNC: 92 MG/DL (ref 70–99)
GLUCOSE BLD-MCNC: 93 MG/DL (ref 70–99)
GLUCOSE BLD-MCNC: 94 MG/DL (ref 70–99)
GLUCOSE BLD-MCNC: 98 MG/DL (ref 70–99)
GLUCOSE UR STRIP.AUTO-MCNC: NEGATIVE MG/DL
HAPTOGLOB SERPL-MCNC: 368 MG/DL (ref 30–200)
HAV IGM SER QL: 1.4 MG/DL (ref 1.6–2.6)
HAV IGM SER QL: 1.4 MG/DL (ref 1.6–2.6)
HAV IGM SER QL: 1.5 MG/DL (ref 1.6–2.6)
HAV IGM SER QL: 1.6 MG/DL (ref 1.6–2.6)
HAV IGM SER QL: 1.7 MG/DL (ref 1.6–2.6)
HAV IGM SER QL: 1.7 MG/DL (ref 1.6–2.6)
HAV IGM SER QL: 1.9 MG/DL (ref 1.6–2.6)
HAV IGM SER QL: 2 MG/DL (ref 1.6–2.6)
HBA1C MFR BLD HPLC: 6.9 % (ref ?–5.7)
HCT VFR BLD AUTO: 25.4 % (ref 35–48)
HCT VFR BLD AUTO: 26.4 % (ref 35–48)
HCT VFR BLD AUTO: 27.9 % (ref 35–48)
HCT VFR BLD AUTO: 29 % (ref 35–48)
HCT VFR BLD AUTO: 29.8 % (ref 35–48)
HCT VFR BLD AUTO: 29.9 % (ref 35–48)
HCT VFR BLD AUTO: 30.4 % (ref 35–48)
HCT VFR BLD AUTO: 30.7 % (ref 35–48)
HCT VFR BLD AUTO: 31.1 % (ref 35–48)
HCT VFR BLD AUTO: 31.5 % (ref 35–48)
HCT VFR BLD AUTO: 32.3 % (ref 35–48)
HCT VFR BLD AUTO: 32.9 % (ref 35–48)
HCT VFR BLD AUTO: 35 % (ref 35–48)
HCT VFR BLD AUTO: 40.6 % (ref 35–48)
HGB BLD-MCNC: 10 G/DL (ref 12–16)
HGB BLD-MCNC: 10.5 G/DL (ref 12–16)
HGB BLD-MCNC: 11.1 G/DL (ref 12–16)
HGB BLD-MCNC: 12.7 G/DL (ref 12–16)
HGB BLD-MCNC: 7.8 G/DL (ref 12–16)
HGB BLD-MCNC: 8.2 G/DL (ref 12–16)
HGB BLD-MCNC: 8.5 G/DL (ref 12–16)
HGB BLD-MCNC: 8.8 G/DL (ref 12–16)
HGB BLD-MCNC: 9 G/DL (ref 12–16)
HGB BLD-MCNC: 9.1 G/DL (ref 12–16)
HGB BLD-MCNC: 9.4 G/DL (ref 12–16)
HGB BLD-MCNC: 9.7 G/DL (ref 12–16)
HGB BLD-MCNC: 9.8 G/DL (ref 12–16)
HGB BLD-MCNC: 9.8 G/DL (ref 12–16)
HGB RETIC QN AUTO: 19.2 PG (ref 28.2–36.6)
HYALINE CASTS #/AREA URNS AUTO: PRESENT /LPF
IMM GRANULOCYTES # BLD AUTO: 0.02 X10(3) UL (ref 0–1)
IMM GRANULOCYTES # BLD AUTO: 0.03 X10(3) UL (ref 0–1)
IMM GRANULOCYTES # BLD AUTO: 0.04 X10(3) UL (ref 0–1)
IMM GRANULOCYTES # BLD AUTO: 0.06 X10(3) UL (ref 0–1)
IMM GRANULOCYTES # BLD AUTO: 0.13 X10(3) UL (ref 0–1)
IMM GRANULOCYTES # BLD AUTO: 0.16 X10(3) UL (ref 0–1)
IMM GRANULOCYTES # BLD AUTO: 0.19 X10(3) UL (ref 0–1)
IMM GRANULOCYTES # BLD AUTO: 0.3 X10(3) UL (ref 0–1)
IMM GRANULOCYTES NFR BLD: 0.4 %
IMM GRANULOCYTES NFR BLD: 0.5 %
IMM GRANULOCYTES NFR BLD: 0.5 %
IMM GRANULOCYTES NFR BLD: 0.6 %
IMM GRANULOCYTES NFR BLD: 0.6 %
IMM GRANULOCYTES NFR BLD: 1.2 %
IMM GRANULOCYTES NFR BLD: 1.5 %
IMM GRANULOCYTES NFR BLD: 1.8 %
IMM GRANULOCYTES NFR BLD: 2 %
IMM RETICS NFR: 0.05 RATIO (ref 0.1–0.3)
INR BLD: 1.14 (ref 0.9–1.1)
IRON SATURATION: 6 % (ref 15–50)
IRON SERPL-MCNC: 12 UG/DL (ref 50–170)
KETONES UR STRIP.AUTO-MCNC: NEGATIVE MG/DL
LACTATE SERPL-SCNC: 1.3 MMOL/L (ref 0.4–2)
LDH SERPL L TO P-CCNC: 169 U/L (ref 84–246)
LEUKOCYTE ESTERASE UR QL STRIP.AUTO: NEGATIVE
LEUKOCYTE ESTERASE UR QL STRIP.AUTO: NEGATIVE
LYMPHOCYTES # BLD AUTO: 0.29 X10(3) UL (ref 1–4)
LYMPHOCYTES # BLD AUTO: 0.3 X10(3) UL (ref 1–4)
LYMPHOCYTES # BLD AUTO: 0.38 X10(3) UL (ref 1–4)
LYMPHOCYTES # BLD AUTO: 0.44 X10(3) UL (ref 1–4)
LYMPHOCYTES # BLD AUTO: 0.49 X10(3) UL (ref 1–4)
LYMPHOCYTES # BLD AUTO: 0.5 X10(3) UL (ref 1–4)
LYMPHOCYTES # BLD AUTO: 0.54 X10(3) UL (ref 1–4)
LYMPHOCYTES # BLD AUTO: 0.55 X10(3) UL (ref 1–4)
LYMPHOCYTES # BLD AUTO: 0.57 X10(3) UL (ref 1–4)
LYMPHOCYTES # BLD AUTO: 0.58 X10(3) UL (ref 1–4)
LYMPHOCYTES # BLD AUTO: 0.6 X10(3) UL (ref 1–4)
LYMPHOCYTES # BLD AUTO: 0.62 X10(3) UL (ref 1–4)
LYMPHOCYTES # BLD AUTO: 0.64 X10(3) UL (ref 1–4)
LYMPHOCYTES # BLD AUTO: 0.91 X10(3) UL (ref 1–4)
LYMPHOCYTES NFR BLD AUTO: 10.9 %
LYMPHOCYTES NFR BLD AUTO: 11 %
LYMPHOCYTES NFR BLD AUTO: 11.5 %
LYMPHOCYTES NFR BLD AUTO: 11.9 %
LYMPHOCYTES NFR BLD AUTO: 2 %
LYMPHOCYTES NFR BLD AUTO: 2.8 %
LYMPHOCYTES NFR BLD AUTO: 3.8 %
LYMPHOCYTES NFR BLD AUTO: 5.5 %
LYMPHOCYTES NFR BLD AUTO: 5.6 %
LYMPHOCYTES NFR BLD AUTO: 5.9 %
LYMPHOCYTES NFR BLD AUTO: 8.1 %
LYMPHOCYTES NFR BLD AUTO: 8.6 %
LYMPHOCYTES NFR BLD AUTO: 9.2 %
LYMPHOCYTES NFR BLD AUTO: 9.8 %
M PROTEIN MFR SERPL ELPH: 5.2 G/DL (ref 6.4–8.2)
M PROTEIN MFR SERPL ELPH: 5.2 G/DL (ref 6.4–8.2)
M PROTEIN MFR SERPL ELPH: 5.4 G/DL (ref 6.4–8.2)
M PROTEIN MFR SERPL ELPH: 5.6 G/DL (ref 6.4–8.2)
M PROTEIN MFR SERPL ELPH: 5.7 G/DL (ref 6.4–8.2)
M PROTEIN MFR SERPL ELPH: 5.8 G/DL (ref 6.4–8.2)
M PROTEIN MFR SERPL ELPH: 5.9 G/DL (ref 6.4–8.2)
M PROTEIN MFR SERPL ELPH: 5.9 G/DL (ref 6.4–8.2)
M PROTEIN MFR SERPL ELPH: 6.2 G/DL (ref 6.4–8.2)
MCH RBC QN AUTO: 26.3 PG (ref 26–34)
MCH RBC QN AUTO: 26.4 PG (ref 26–34)
MCH RBC QN AUTO: 26.5 PG (ref 26–34)
MCH RBC QN AUTO: 26.6 PG (ref 26–34)
MCH RBC QN AUTO: 26.7 PG (ref 26–34)
MCH RBC QN AUTO: 26.8 PG (ref 26–34)
MCH RBC QN AUTO: 27.4 PG (ref 26–34)
MCH RBC QN AUTO: 27.6 PG (ref 26–34)
MCH RBC QN AUTO: 27.7 PG (ref 26–34)
MCH RBC QN AUTO: 27.9 PG (ref 26–34)
MCH RBC QN AUTO: 28.2 PG (ref 26–34)
MCHC RBC AUTO-ENTMCNC: 30.1 G/DL (ref 31–37)
MCHC RBC AUTO-ENTMCNC: 30.2 G/DL (ref 31–37)
MCHC RBC AUTO-ENTMCNC: 30.3 G/DL (ref 31–37)
MCHC RBC AUTO-ENTMCNC: 30.5 G/DL (ref 31–37)
MCHC RBC AUTO-ENTMCNC: 30.5 G/DL (ref 31–37)
MCHC RBC AUTO-ENTMCNC: 30.7 G/DL (ref 31–37)
MCHC RBC AUTO-ENTMCNC: 30.8 G/DL (ref 31–37)
MCHC RBC AUTO-ENTMCNC: 31 G/DL (ref 31–37)
MCHC RBC AUTO-ENTMCNC: 31.1 G/DL (ref 31–37)
MCHC RBC AUTO-ENTMCNC: 31.3 G/DL (ref 31–37)
MCHC RBC AUTO-ENTMCNC: 31.7 G/DL (ref 31–37)
MCHC RBC AUTO-ENTMCNC: 31.9 G/DL (ref 31–37)
MCHC RBC AUTO-ENTMCNC: 31.9 G/DL (ref 31–37)
MCHC RBC AUTO-ENTMCNC: 32.2 G/DL (ref 31–37)
MCV RBC AUTO: 86.3 FL (ref 80–100)
MCV RBC AUTO: 86.4 FL (ref 80–100)
MCV RBC AUTO: 86.4 FL (ref 80–100)
MCV RBC AUTO: 86.6 FL (ref 80–100)
MCV RBC AUTO: 86.7 FL (ref 80–100)
MCV RBC AUTO: 86.7 FL (ref 80–100)
MCV RBC AUTO: 87.5 FL (ref 80–100)
MCV RBC AUTO: 88.1 FL (ref 80–100)
MCV RBC AUTO: 88.2 FL (ref 80–100)
MCV RBC AUTO: 88.8 FL (ref 80–100)
MCV RBC AUTO: 89 FL (ref 80–100)
MCV RBC AUTO: 89.2 FL (ref 80–100)
MCV RBC AUTO: 90.5 FL (ref 80–100)
MCV RBC AUTO: 90.7 FL (ref 80–100)
METAPNEUMOVIRUS PCR:: NEGATIVE
MONOCYTES # BLD AUTO: 0.45 X10(3) UL (ref 0.1–1)
MONOCYTES # BLD AUTO: 0.56 X10(3) UL (ref 0.1–1)
MONOCYTES # BLD AUTO: 0.57 X10(3) UL (ref 0.1–1)
MONOCYTES # BLD AUTO: 0.6 X10(3) UL (ref 0.1–1)
MONOCYTES # BLD AUTO: 0.64 X10(3) UL (ref 0.1–1)
MONOCYTES # BLD AUTO: 0.66 X10(3) UL (ref 0.1–1)
MONOCYTES # BLD AUTO: 0.67 X10(3) UL (ref 0.1–1)
MONOCYTES # BLD AUTO: 0.76 X10(3) UL (ref 0.1–1)
MONOCYTES # BLD AUTO: 0.78 X10(3) UL (ref 0.1–1)
MONOCYTES # BLD AUTO: 0.8 X10(3) UL (ref 0.1–1)
MONOCYTES # BLD AUTO: 0.93 X10(3) UL (ref 0.1–1)
MONOCYTES # BLD AUTO: 1 X10(3) UL (ref 0.1–1)
MONOCYTES # BLD AUTO: 1.06 X10(3) UL (ref 0.1–1)
MONOCYTES # BLD AUTO: 1.16 X10(3) UL (ref 0.1–1)
MONOCYTES NFR BLD AUTO: 10 %
MONOCYTES NFR BLD AUTO: 10.5 %
MONOCYTES NFR BLD AUTO: 10.9 %
MONOCYTES NFR BLD AUTO: 11 %
MONOCYTES NFR BLD AUTO: 11 %
MONOCYTES NFR BLD AUTO: 11.1 %
MONOCYTES NFR BLD AUTO: 11.6 %
MONOCYTES NFR BLD AUTO: 12.1 %
MONOCYTES NFR BLD AUTO: 5.3 %
MONOCYTES NFR BLD AUTO: 6.9 %
MONOCYTES NFR BLD AUTO: 8.9 %
MONOCYTES NFR BLD AUTO: 9.8 %
MONOCYTES NFR BLD AUTO: 9.9 %
MONOCYTES NFR BLD AUTO: 9.9 %
MYCOPLASMA PNEUMONIA PCR:: NEGATIVE
NEUTROPHILS # BLD AUTO: 13.72 X10 (3) UL (ref 1.5–7.7)
NEUTROPHILS # BLD AUTO: 13.72 X10(3) UL (ref 1.5–7.7)
NEUTROPHILS # BLD AUTO: 3.41 X10 (3) UL (ref 1.5–7.7)
NEUTROPHILS # BLD AUTO: 3.41 X10(3) UL (ref 1.5–7.7)
NEUTROPHILS # BLD AUTO: 3.98 X10 (3) UL (ref 1.5–7.7)
NEUTROPHILS # BLD AUTO: 3.98 X10 (3) UL (ref 1.5–7.7)
NEUTROPHILS # BLD AUTO: 3.98 X10(3) UL (ref 1.5–7.7)
NEUTROPHILS # BLD AUTO: 3.98 X10(3) UL (ref 1.5–7.7)
NEUTROPHILS # BLD AUTO: 4.09 X10 (3) UL (ref 1.5–7.7)
NEUTROPHILS # BLD AUTO: 4.09 X10(3) UL (ref 1.5–7.7)
NEUTROPHILS # BLD AUTO: 4.27 X10 (3) UL (ref 1.5–7.7)
NEUTROPHILS # BLD AUTO: 4.27 X10(3) UL (ref 1.5–7.7)
NEUTROPHILS # BLD AUTO: 4.33 X10 (3) UL (ref 1.5–7.7)
NEUTROPHILS # BLD AUTO: 4.33 X10(3) UL (ref 1.5–7.7)
NEUTROPHILS # BLD AUTO: 5.09 X10 (3) UL (ref 1.5–7.7)
NEUTROPHILS # BLD AUTO: 5.09 X10(3) UL (ref 1.5–7.7)
NEUTROPHILS # BLD AUTO: 6.12 X10 (3) UL (ref 1.5–7.7)
NEUTROPHILS # BLD AUTO: 6.12 X10(3) UL (ref 1.5–7.7)
NEUTROPHILS # BLD AUTO: 8.64 X10 (3) UL (ref 1.5–7.7)
NEUTROPHILS # BLD AUTO: 8.64 X10(3) UL (ref 1.5–7.7)
NEUTROPHILS # BLD AUTO: 8.67 X10 (3) UL (ref 1.5–7.7)
NEUTROPHILS # BLD AUTO: 8.67 X10 (3) UL (ref 1.5–7.7)
NEUTROPHILS # BLD AUTO: 8.67 X10(3) UL (ref 1.5–7.7)
NEUTROPHILS # BLD AUTO: 8.67 X10(3) UL (ref 1.5–7.7)
NEUTROPHILS # BLD AUTO: 9.13 X10 (3) UL (ref 1.5–7.7)
NEUTROPHILS # BLD AUTO: 9.13 X10(3) UL (ref 1.5–7.7)
NEUTROPHILS # BLD AUTO: 9.28 X10 (3) UL (ref 1.5–7.7)
NEUTROPHILS # BLD AUTO: 9.28 X10(3) UL (ref 1.5–7.7)
NEUTROPHILS NFR BLD AUTO: 74.9 %
NEUTROPHILS NFR BLD AUTO: 75.9 %
NEUTROPHILS NFR BLD AUTO: 76.4 %
NEUTROPHILS NFR BLD AUTO: 76.9 %
NEUTROPHILS NFR BLD AUTO: 77.2 %
NEUTROPHILS NFR BLD AUTO: 77.8 %
NEUTROPHILS NFR BLD AUTO: 79.1 %
NEUTROPHILS NFR BLD AUTO: 79.4 %
NEUTROPHILS NFR BLD AUTO: 81.3 %
NEUTROPHILS NFR BLD AUTO: 81.6 %
NEUTROPHILS NFR BLD AUTO: 84.8 %
NEUTROPHILS NFR BLD AUTO: 85.6 %
NEUTROPHILS NFR BLD AUTO: 87.4 %
NEUTROPHILS NFR BLD AUTO: 90.5 %
NITRITE UR QL STRIP.AUTO: NEGATIVE
NOROVIRUS GI/GII PCR: NEGATIVE
NT-PROBNP SERPL-MCNC: 2828 PG/ML (ref ?–450)
OSMOLALITY SERPL CALC.SUM OF ELEC: 252 MOSM/KG (ref 275–295)
OSMOLALITY SERPL CALC.SUM OF ELEC: 252 MOSM/KG (ref 275–295)
OSMOLALITY SERPL CALC.SUM OF ELEC: 255 MOSM/KG (ref 275–295)
OSMOLALITY SERPL CALC.SUM OF ELEC: 256 MOSM/KG (ref 275–295)
OSMOLALITY SERPL CALC.SUM OF ELEC: 256 MOSM/KG (ref 275–295)
OSMOLALITY SERPL CALC.SUM OF ELEC: 257 MOSM/KG (ref 275–295)
OSMOLALITY SERPL CALC.SUM OF ELEC: 260 MOSM/KG (ref 275–295)
OSMOLALITY SERPL CALC.SUM OF ELEC: 264 MOSM/KG (ref 275–295)
OSMOLALITY SERPL CALC.SUM OF ELEC: 268 MOSM/KG (ref 275–295)
OSMOLALITY SERPL CALC.SUM OF ELEC: 269 MOSM/KG (ref 275–295)
OSMOLALITY SERPL CALC.SUM OF ELEC: 271 MOSM/KG (ref 275–295)
OSMOLALITY SERPL CALC.SUM OF ELEC: 272 MOSM/KG (ref 275–295)
OSMOLALITY SERPL CALC.SUM OF ELEC: 273 MOSM/KG (ref 275–295)
OSMOLALITY SERPL CALC.SUM OF ELEC: 273 MOSM/KG (ref 275–295)
OSMOLALITY SERPL CALC.SUM OF ELEC: 274 MOSM/KG (ref 275–295)
OSMOLALITY SERPL CALC.SUM OF ELEC: 275 MOSM/KG (ref 275–295)
OSMOLALITY SERPL CALC.SUM OF ELEC: 276 MOSM/KG (ref 275–295)
OSMOLALITY SERPL CALC.SUM OF ELEC: 277 MOSM/KG (ref 275–295)
OSMOLALITY SERPL CALC.SUM OF ELEC: 278 MOSM/KG (ref 275–295)
OSMOLALITY SERPL CALC.SUM OF ELEC: 279 MOSM/KG (ref 275–295)
OSMOLALITY SERPL CALC.SUM OF ELEC: 279 MOSM/KG (ref 275–295)
OSMOLALITY SERPL CALC.SUM OF ELEC: 280 MOSM/KG (ref 275–295)
OSMOLALITY SERPL CALC.SUM OF ELEC: 281 MOSM/KG (ref 275–295)
OSMOLALITY SERPL CALC.SUM OF ELEC: 282 MOSM/KG (ref 275–295)
OSMOLALITY SERPL CALC.SUM OF ELEC: 283 MOSM/KG (ref 275–295)
OSMOLALITY SERPL CALC.SUM OF ELEC: 291 MOSM/KG (ref 275–295)
OSMOLALITY SERPL: 286 MOSM/KG (ref 280–300)
OSMOLALITY UR: 395 MOSM/KG (ref 300–1300)
OSMOLALITY UR: 395 MOSM/KG (ref 300–1300)
P AXIS: 57 DEGREES
P AXIS: 69 DEGREES
P AXIS: 70 DEGREES
P AXIS: 94 DEGREES
P SHIGELLOIDES DNA STL QL NAA+PROBE: NEGATIVE
P-R INTERVAL: 138 MS
P-R INTERVAL: 148 MS
P-R INTERVAL: 150 MS
P-R INTERVAL: 158 MS
P-R INTERVAL: 170 MS
PARAINFLUENZA 1 PCR:: NEGATIVE
PARAINFLUENZA 2 PCR:: NEGATIVE
PARAINFLUENZA 3 PCR:: NEGATIVE
PARAINFLUENZA 4 PCR:: NEGATIVE
PH UR STRIP.AUTO: 5 [PH] (ref 4.5–8)
PH UR STRIP.AUTO: 6 [PH] (ref 4.5–8)
PLATELET # BLD AUTO: 175 10(3)UL (ref 150–450)
PLATELET # BLD AUTO: 201 10(3)UL (ref 150–450)
PLATELET # BLD AUTO: 202 10(3)UL (ref 150–450)
PLATELET # BLD AUTO: 211 10(3)UL (ref 150–450)
PLATELET # BLD AUTO: 213 10(3)UL (ref 150–450)
PLATELET # BLD AUTO: 215 10(3)UL (ref 150–450)
PLATELET # BLD AUTO: 220 10(3)UL (ref 150–450)
PLATELET # BLD AUTO: 225 10(3)UL (ref 150–450)
PLATELET # BLD AUTO: 244 10(3)UL (ref 150–450)
PLATELET # BLD AUTO: 258 10(3)UL (ref 150–450)
PLATELET # BLD AUTO: 301 10(3)UL (ref 150–450)
PLATELET # BLD AUTO: 308 10(3)UL (ref 150–450)
PLATELET # BLD AUTO: 322 10(3)UL (ref 150–450)
PLATELET # BLD AUTO: 340 10(3)UL (ref 150–450)
POTASSIUM SERPL-SCNC: 3.6 MMOL/L (ref 3.5–5.1)
POTASSIUM SERPL-SCNC: 3.7 MMOL/L (ref 3.5–5.1)
POTASSIUM SERPL-SCNC: 3.8 MMOL/L (ref 3.5–5.1)
POTASSIUM SERPL-SCNC: 3.9 MMOL/L (ref 3.5–5.1)
POTASSIUM SERPL-SCNC: 4 MMOL/L (ref 3.5–5.1)
POTASSIUM SERPL-SCNC: 4 MMOL/L (ref 3.5–5.1)
POTASSIUM SERPL-SCNC: 4.1 MMOL/L (ref 3.5–5.1)
POTASSIUM SERPL-SCNC: 4.2 MMOL/L (ref 3.5–5.1)
POTASSIUM SERPL-SCNC: 4.3 MMOL/L (ref 3.5–5.1)
POTASSIUM SERPL-SCNC: 4.4 MMOL/L (ref 3.5–5.1)
POTASSIUM SERPL-SCNC: 4.5 MMOL/L (ref 3.5–5.1)
POTASSIUM SERPL-SCNC: 4.5 MMOL/L (ref 3.5–5.1)
POTASSIUM, FECAL: 85 MMOL/L
PROT UR STRIP.AUTO-MCNC: 30 MG/DL
PROT UR STRIP.AUTO-MCNC: 30 MG/DL
PROT UR STRIP.AUTO-MCNC: NEGATIVE MG/DL
PSA SERPL DL<=0.01 NG/ML-MCNC: 15.1 SECONDS (ref 12.5–14.7)
Q-T INTERVAL: 322 MS
Q-T INTERVAL: 372 MS
Q-T INTERVAL: 374 MS
Q-T INTERVAL: 388 MS
Q-T INTERVAL: 398 MS
QRS DURATION: 86 MS
QRS DURATION: 88 MS
QRS DURATION: 94 MS
QTC CALCULATION (BEZET): 421 MS
QTC CALCULATION (BEZET): 423 MS
QTC CALCULATION (BEZET): 429 MS
QTC CALCULATION (BEZET): 437 MS
QTC CALCULATION (BEZET): 447 MS
R AXIS: -12 DEGREES
R AXIS: 1 DEGREES
R AXIS: 187 DEGREES
R AXIS: 194 DEGREES
R AXIS: 2 DEGREES
RBC # BLD AUTO: 2.93 X10(6)UL (ref 3.8–5.3)
RBC # BLD AUTO: 3.06 X10(6)UL (ref 3.8–5.3)
RBC # BLD AUTO: 3.23 X10(6)UL (ref 3.8–5.3)
RBC # BLD AUTO: 3.29 X10(6)UL (ref 3.8–5.3)
RBC # BLD AUTO: 3.39 X10(6)UL (ref 3.8–5.3)
RBC # BLD AUTO: 3.43 X10(6)UL (ref 3.8–5.3)
RBC # BLD AUTO: 3.45 X10(6)UL (ref 3.8–5.3)
RBC # BLD AUTO: 3.48 X10(6)UL (ref 3.8–5.3)
RBC # BLD AUTO: 3.51 X10(6)UL (ref 3.8–5.3)
RBC # BLD AUTO: 3.54 X10(6)UL (ref 3.8–5.3)
RBC # BLD AUTO: 3.62 X10(6)UL (ref 3.8–5.3)
RBC # BLD AUTO: 3.76 X10(6)UL (ref 3.8–5.3)
RBC # BLD AUTO: 3.94 X10(6)UL (ref 3.8–5.3)
RBC # BLD AUTO: 4.56 X10(6)UL (ref 3.8–5.3)
RBC #/AREA URNS AUTO: >10 /HPF
RBC UR QL AUTO: NEGATIVE
RETICS # AUTO: 22.8 X10(3) UL (ref 22.5–147.5)
RETICS/RBC NFR AUTO: 0.8 % (ref 0.5–2.5)
RHINOVIRUS/ENTERO PCR:: NEGATIVE
ROTAVIRUS A PCR: NEGATIVE
RSV RNA SPEC QL NAA+PROBE: NEGATIVE
SALMONELLA DNA SPEC QL NAA+PROBE: NEGATIVE
SAPOVIRUS PCR: POSITIVE
SHIGELLA SP+EIEC IPAH ST NAA+NON-PROBE: NEGATIVE
SODIUM SERPL-SCNC: 120 MMOL/L
SODIUM SERPL-SCNC: 121 MMOL/L (ref 136–145)
SODIUM SERPL-SCNC: 121 MMOL/L (ref 136–145)
SODIUM SERPL-SCNC: 122 MMOL/L (ref 136–145)
SODIUM SERPL-SCNC: 123 MMOL/L (ref 136–145)
SODIUM SERPL-SCNC: 123 MMOL/L (ref 136–145)
SODIUM SERPL-SCNC: 124 MMOL/L (ref 136–145)
SODIUM SERPL-SCNC: 126 MMOL/L (ref 136–145)
SODIUM SERPL-SCNC: 127 MMOL/L (ref 136–145)
SODIUM SERPL-SCNC: 128 MMOL/L (ref 136–145)
SODIUM SERPL-SCNC: 129 MMOL/L (ref 136–145)
SODIUM SERPL-SCNC: 130 MMOL/L (ref 136–145)
SODIUM SERPL-SCNC: 131 MMOL/L (ref 136–145)
SODIUM SERPL-SCNC: 132 MMOL/L (ref 136–145)
SODIUM SERPL-SCNC: 132 MMOL/L (ref 136–145)
SODIUM SERPL-SCNC: 133 MMOL/L (ref 136–145)
SODIUM SERPL-SCNC: 134 MMOL/L (ref 136–145)
SODIUM SERPL-SCNC: 134 MMOL/L (ref 136–145)
SODIUM SERPL-SCNC: 139 MMOL/L (ref 136–145)
SODIUM SERPL-SCNC: 72 MMOL/L
SODIUM, FECAL: 85 MMOL/L
SP GR UR STRIP.AUTO: 1.01 (ref 1–1.03)
T AXIS: 168 DEGREES
T AXIS: 169 DEGREES
T AXIS: 26 DEGREES
T AXIS: 30 DEGREES
T AXIS: 38 DEGREES
TOTAL IRON BINDING CAPACITY: 212 UG/DL (ref 240–450)
TRANSFERRIN SERPL-MCNC: 142 MG/DL (ref 200–360)
TROPONIN I SERPL-MCNC: <0.045 NG/ML (ref ?–0.04)
TSI SER-ACNC: 0.51 MIU/ML (ref 0.36–3.74)
TSI SER-ACNC: 1.13 MIU/ML (ref 0.36–3.74)
URATE SERPL-MCNC: 3.4 MG/DL (ref 2.6–6)
URATE SERPL-MCNC: 7.2 MG/DL (ref 2.6–6)
UROBILINOGEN UR STRIP.AUTO-MCNC: <2 MG/DL
V CHOLERAE DNA SPEC QL NAA+PROBE: NEGATIVE
VENTRICULAR RATE: 103 BPM
VENTRICULAR RATE: 70 BPM
VENTRICULAR RATE: 77 BPM
VENTRICULAR RATE: 80 BPM
VENTRICULAR RATE: 83 BPM
VIBRIO DNA SPEC NAA+PROBE: NEGATIVE
VIT B12 SERPL-MCNC: 433 PG/ML (ref 193–986)
WBC # BLD AUTO: 10.1 X10(3) UL (ref 4–11)
WBC # BLD AUTO: 10.4 X10(3) UL (ref 4–11)
WBC # BLD AUTO: 10.6 X10(3) UL (ref 4–11)
WBC # BLD AUTO: 10.7 X10(3) UL (ref 4–11)
WBC # BLD AUTO: 10.9 X10(3) UL (ref 4–11)
WBC # BLD AUTO: 15.2 X10(3) UL (ref 4–11)
WBC # BLD AUTO: 4.6 X10(3) UL (ref 4–11)
WBC # BLD AUTO: 5.1 X10(3) UL (ref 4–11)
WBC # BLD AUTO: 5.2 X10(3) UL (ref 4–11)
WBC # BLD AUTO: 5.3 X10(3) UL (ref 4–11)
WBC # BLD AUTO: 5.4 X10(3) UL (ref 4–11)
WBC # BLD AUTO: 5.7 X10(3) UL (ref 4–11)
WBC # BLD AUTO: 6.4 X10(3) UL (ref 4–11)
WBC # BLD AUTO: 7.9 X10(3) UL (ref 4–11)
WBC #/AREA URNS AUTO: >50 /HPF
WBC CLUMPS UR QL AUTO: PRESENT
YERSINIA DNA SPEC NAA+PROBE: NEGATIVE

## 2019-01-01 PROCEDURE — 0DB68ZX EXCISION OF STOMACH, VIA NATURAL OR ARTIFICIAL OPENING ENDOSCOPIC, DIAGNOSTIC: ICD-10-PCS | Performed by: INTERNAL MEDICINE

## 2019-01-01 PROCEDURE — 99232 SBSQ HOSP IP/OBS MODERATE 35: CPT | Performed by: HOSPITALIST

## 2019-01-01 PROCEDURE — 83735 ASSAY OF MAGNESIUM: CPT

## 2019-01-01 PROCEDURE — 82565 ASSAY OF CREATININE: CPT

## 2019-01-01 PROCEDURE — 99232 SBSQ HOSP IP/OBS MODERATE 35: CPT | Performed by: INTERNAL MEDICINE

## 2019-01-01 PROCEDURE — 1111F DSCHRG MED/CURRENT MED MERGE: CPT

## 2019-01-01 PROCEDURE — 99225 SUBSEQUENT OBSERVATION CARE: CPT | Performed by: HOSPITALIST

## 2019-01-01 PROCEDURE — 87493 C DIFF AMPLIFIED PROBE: CPT

## 2019-01-01 PROCEDURE — 71045 X-RAY EXAM CHEST 1 VIEW: CPT | Performed by: EMERGENCY MEDICINE

## 2019-01-01 PROCEDURE — 71045 X-RAY EXAM CHEST 1 VIEW: CPT | Performed by: STUDENT IN AN ORGANIZED HEALTH CARE EDUCATION/TRAINING PROGRAM

## 2019-01-01 PROCEDURE — 99283 EMERGENCY DEPT VISIT LOW MDM: CPT

## 2019-01-01 PROCEDURE — 78806 NM INFECTION INDIUM WBC 111 COMPLETE  (CPT=78806): CPT | Performed by: INTERNAL MEDICINE

## 2019-01-01 PROCEDURE — 99239 HOSP IP/OBS DSCHRG MGMT >30: CPT | Performed by: HOSPITALIST

## 2019-01-01 PROCEDURE — 99213 OFFICE O/P EST LOW 20 MIN: CPT | Performed by: FAMILY MEDICINE

## 2019-01-01 PROCEDURE — 73090 X-RAY EXAM OF FOREARM: CPT | Performed by: NURSE PRACTITIONER

## 2019-01-01 PROCEDURE — 99223 1ST HOSP IP/OBS HIGH 75: CPT | Performed by: STUDENT IN AN ORGANIZED HEALTH CARE EDUCATION/TRAINING PROGRAM

## 2019-01-01 PROCEDURE — 99496 TRANSJ CARE MGMT HIGH F2F 7D: CPT | Performed by: FAMILY MEDICINE

## 2019-01-01 PROCEDURE — 36415 COLL VENOUS BLD VENIPUNCTURE: CPT

## 2019-01-01 PROCEDURE — 85025 COMPLETE CBC W/AUTO DIFF WBC: CPT

## 2019-01-01 PROCEDURE — 99214 OFFICE O/P EST MOD 30 MIN: CPT | Performed by: FAMILY MEDICINE

## 2019-01-01 PROCEDURE — 99222 1ST HOSP IP/OBS MODERATE 55: CPT | Performed by: NURSE PRACTITIONER

## 2019-01-01 PROCEDURE — 99231 SBSQ HOSP IP/OBS SF/LOW 25: CPT | Performed by: NURSE PRACTITIONER

## 2019-01-01 PROCEDURE — 83036 HEMOGLOBIN GLYCOSYLATED A1C: CPT | Performed by: FAMILY MEDICINE

## 2019-01-01 PROCEDURE — 84100 ASSAY OF PHOSPHORUS: CPT

## 2019-01-01 PROCEDURE — 0DB48ZX EXCISION OF ESOPHAGOGASTRIC JUNCTION, VIA NATURAL OR ARTIFICIAL OPENING ENDOSCOPIC, DIAGNOSTIC: ICD-10-PCS | Performed by: INTERNAL MEDICINE

## 2019-01-01 PROCEDURE — 0DB58ZX EXCISION OF ESOPHAGUS, VIA NATURAL OR ARTIFICIAL OPENING ENDOSCOPIC, DIAGNOSTIC: ICD-10-PCS | Performed by: INTERNAL MEDICINE

## 2019-01-01 PROCEDURE — 82306 VITAMIN D 25 HYDROXY: CPT

## 2019-01-01 PROCEDURE — 99233 SBSQ HOSP IP/OBS HIGH 50: CPT | Performed by: INTERNAL MEDICINE

## 2019-01-01 PROCEDURE — 99213 OFFICE O/P EST LOW 20 MIN: CPT | Performed by: NURSE PRACTITIONER

## 2019-01-01 PROCEDURE — 80048 BASIC METABOLIC PNL TOTAL CA: CPT

## 2019-01-01 PROCEDURE — 99283 EMERGENCY DEPT VISIT LOW MDM: CPT | Performed by: EMERGENCY MEDICINE

## 2019-01-01 PROCEDURE — 99223 1ST HOSP IP/OBS HIGH 75: CPT | Performed by: HOSPITALIST

## 2019-01-01 PROCEDURE — 93971 EXTREMITY STUDY: CPT | Performed by: SURGERY

## 2019-01-01 PROCEDURE — 99220 INITIAL OBSERVATION CARE,LEVL III: CPT | Performed by: HOSPITALIST

## 2019-01-01 PROCEDURE — 99231 SBSQ HOSP IP/OBS SF/LOW 25: CPT | Performed by: HOSPITALIST

## 2019-01-01 PROCEDURE — 81003 URINALYSIS AUTO W/O SCOPE: CPT | Performed by: EMERGENCY MEDICINE

## 2019-01-01 PROCEDURE — 99222 1ST HOSP IP/OBS MODERATE 55: CPT | Performed by: OTHER

## 2019-01-01 PROCEDURE — 72110 X-RAY EXAM L-2 SPINE 4/>VWS: CPT | Performed by: FAMILY MEDICINE

## 2019-01-01 PROCEDURE — 80053 COMPREHEN METABOLIC PANEL: CPT | Performed by: EMERGENCY MEDICINE

## 2019-01-01 PROCEDURE — 74183 MRI ABD W/O CNTR FLWD CNTR: CPT | Performed by: NURSE PRACTITIONER

## 2019-01-01 PROCEDURE — 81001 URINALYSIS AUTO W/SCOPE: CPT | Performed by: EMERGENCY MEDICINE

## 2019-01-01 PROCEDURE — 93931 UPPER EXTREMITY STUDY: CPT | Performed by: SURGERY

## 2019-01-01 PROCEDURE — 96361 HYDRATE IV INFUSION ADD-ON: CPT

## 2019-01-01 PROCEDURE — 99233 SBSQ HOSP IP/OBS HIGH 50: CPT | Performed by: HOSPITALIST

## 2019-01-01 PROCEDURE — 82657 ENZYME CELL ACTIVITY: CPT

## 2019-01-01 PROCEDURE — 74176 CT ABD & PELVIS W/O CONTRAST: CPT | Performed by: EMERGENCY MEDICINE

## 2019-01-01 PROCEDURE — 78807 NM INFECTION INDIUM-111 WBC SPECT/CT (CPT=78806/78807/78999): CPT | Performed by: INTERNAL MEDICINE

## 2019-01-01 PROCEDURE — 99232 SBSQ HOSP IP/OBS MODERATE 35: CPT | Performed by: NURSE PRACTITIONER

## 2019-01-01 PROCEDURE — 74174 CTA ABD&PLVS W/CONTRAST: CPT | Performed by: SURGERY

## 2019-01-01 PROCEDURE — 78806 NM INFECTION INDIUM-111 WBC SPECT/CT (CPT=78806/78807/78999): CPT | Performed by: INTERNAL MEDICINE

## 2019-01-01 PROCEDURE — 84443 ASSAY THYROID STIM HORMONE: CPT

## 2019-01-01 PROCEDURE — 74018 RADEX ABDOMEN 1 VIEW: CPT | Performed by: INTERNAL MEDICINE

## 2019-01-01 PROCEDURE — 76700 US EXAM ABDOM COMPLETE: CPT | Performed by: INTERNAL MEDICINE

## 2019-01-01 PROCEDURE — 74177 CT ABD & PELVIS W/CONTRAST: CPT | Performed by: INTERNAL MEDICINE

## 2019-01-01 PROCEDURE — 71046 X-RAY EXAM CHEST 2 VIEWS: CPT | Performed by: EMERGENCY MEDICINE

## 2019-01-01 PROCEDURE — 99223 1ST HOSP IP/OBS HIGH 75: CPT | Performed by: INTERNAL MEDICINE

## 2019-01-01 PROCEDURE — 75635 CT ANGIO ABDOMINAL ARTERIES: CPT | Performed by: INTERNAL MEDICINE

## 2019-01-01 PROCEDURE — 80061 LIPID PANEL: CPT

## 2019-01-01 PROCEDURE — 99217 OBSERVATION CARE DISCHARGE: CPT | Performed by: HOSPITALIST

## 2019-01-01 PROCEDURE — 99212 OFFICE O/P EST SF 10 MIN: CPT | Performed by: FAMILY MEDICINE

## 2019-01-01 PROCEDURE — 76770 US EXAM ABDO BACK WALL COMP: CPT | Performed by: INTERNAL MEDICINE

## 2019-01-01 PROCEDURE — 96374 THER/PROPH/DIAG INJ IV PUSH: CPT

## 2019-01-01 PROCEDURE — 02HV33Z INSERTION OF INFUSION DEVICE INTO SUPERIOR VENA CAVA, PERCUTANEOUS APPROACH: ICD-10-PCS | Performed by: HOSPITALIST

## 2019-01-01 PROCEDURE — 70450 CT HEAD/BRAIN W/O DYE: CPT | Performed by: OTHER

## 2019-01-01 PROCEDURE — 99211 OFF/OP EST MAY X REQ PHY/QHP: CPT

## 2019-01-01 PROCEDURE — 87086 URINE CULTURE/COLONY COUNT: CPT | Performed by: EMERGENCY MEDICINE

## 2019-01-01 PROCEDURE — 99495 TRANSJ CARE MGMT MOD F2F 14D: CPT | Performed by: FAMILY MEDICINE

## 2019-01-01 PROCEDURE — 99214 OFFICE O/P EST MOD 30 MIN: CPT | Performed by: INTERNAL MEDICINE

## 2019-01-01 PROCEDURE — 85025 COMPLETE CBC W/AUTO DIFF WBC: CPT | Performed by: EMERGENCY MEDICINE

## 2019-01-01 PROCEDURE — 82040 ASSAY OF SERUM ALBUMIN: CPT

## 2019-01-01 PROCEDURE — 99232 SBSQ HOSP IP/OBS MODERATE 35: CPT | Performed by: OTHER

## 2019-01-01 PROCEDURE — 0DB98ZX EXCISION OF DUODENUM, VIA NATURAL OR ARTIFICIAL OPENING ENDOSCOPIC, DIAGNOSTIC: ICD-10-PCS | Performed by: INTERNAL MEDICINE

## 2019-01-01 PROCEDURE — 78999 UNLISTED MISC PX DX NUC MED: CPT | Performed by: INTERNAL MEDICINE

## 2019-01-01 PROCEDURE — 99233 SBSQ HOSP IP/OBS HIGH 50: CPT | Performed by: NURSE PRACTITIONER

## 2019-01-01 PROCEDURE — 80197 ASSAY OF TACROLIMUS: CPT

## 2019-01-01 PROCEDURE — 99284 EMERGENCY DEPT VISIT MOD MDM: CPT

## 2019-01-01 PROCEDURE — 0DBE8ZX EXCISION OF LARGE INTESTINE, VIA NATURAL OR ARTIFICIAL OPENING ENDOSCOPIC, DIAGNOSTIC: ICD-10-PCS | Performed by: STUDENT IN AN ORGANIZED HEALTH CARE EDUCATION/TRAINING PROGRAM

## 2019-01-01 PROCEDURE — 72197 MRI PELVIS W/O & W/DYE: CPT | Performed by: NURSE PRACTITIONER

## 2019-01-01 PROCEDURE — 83789 MASS SPECTROMETRY QUAL/QUAN: CPT

## 2019-01-01 PROCEDURE — G0439 PPPS, SUBSEQ VISIT: HCPCS | Performed by: FAMILY MEDICINE

## 2019-01-01 RX ORDER — MAGNESIUM OXIDE 400 MG (241.3 MG MAGNESIUM) TABLET
400 TABLET ONCE
Status: COMPLETED | OUTPATIENT
Start: 2019-01-01 | End: 2019-01-01

## 2019-01-01 RX ORDER — MYCOPHENOLIC ACID 360 MG/1
360 TABLET, DELAYED RELEASE ORAL
Status: ON HOLD | COMMUNITY
End: 2019-01-01

## 2019-01-01 RX ORDER — LOSARTAN POTASSIUM 50 MG/1
TABLET ORAL
Qty: 90 TABLET | Refills: 0 | Status: SHIPPED | OUTPATIENT
Start: 2019-01-01 | End: 2019-01-01

## 2019-01-01 RX ORDER — CYCLOBENZAPRINE HCL 5 MG
5 TABLET ORAL 3 TIMES DAILY PRN
Qty: 30 TABLET | Refills: 0 | Status: SHIPPED | OUTPATIENT
Start: 2019-01-01 | End: 2019-01-01

## 2019-01-01 RX ORDER — PANTOPRAZOLE SODIUM 40 MG/1
40 TABLET, DELAYED RELEASE ORAL
Status: DISCONTINUED | OUTPATIENT
Start: 2019-01-01 | End: 2019-01-01

## 2019-01-01 RX ORDER — HYDRALAZINE HYDROCHLORIDE 100 MG/1
TABLET, FILM COATED ORAL
Qty: 180 TABLET | Refills: 0 | Status: SHIPPED | OUTPATIENT
Start: 2019-01-01 | End: 2019-01-01

## 2019-01-01 RX ORDER — SCOLOPAMINE TRANSDERMAL SYSTEM 1 MG/1
1 PATCH, EXTENDED RELEASE TRANSDERMAL
Status: DISCONTINUED | OUTPATIENT
Start: 2019-08-18 | End: 2019-01-01

## 2019-01-01 RX ORDER — SODIUM CHLORIDE 0.9 % (FLUSH) 0.9 %
10 SYRINGE (ML) INJECTION AS NEEDED
Status: DISCONTINUED | OUTPATIENT
Start: 2019-01-01 | End: 2019-01-01

## 2019-01-01 RX ORDER — HYDRALAZINE HYDROCHLORIDE 25 MG/1
25 TABLET, FILM COATED ORAL EVERY 8 HOURS SCHEDULED
Status: DISCONTINUED | OUTPATIENT
Start: 2019-01-01 | End: 2019-01-01

## 2019-01-01 RX ORDER — ATROPINE SULFATE 10 MG/ML
2 SOLUTION/ DROPS OPHTHALMIC EVERY 2 HOUR PRN
Status: DISCONTINUED | OUTPATIENT
Start: 2019-01-01 | End: 2019-01-01

## 2019-01-01 RX ORDER — DIPHENOXYLATE HYDROCHLORIDE AND ATROPINE SULFATE 2.5; .025 MG/1; MG/1
1 TABLET ORAL 4 TIMES DAILY
Status: DISCONTINUED | OUTPATIENT
Start: 2019-01-01 | End: 2019-01-01

## 2019-01-01 RX ORDER — LOSARTAN POTASSIUM 50 MG/1
50 TABLET ORAL DAILY
Status: DISCONTINUED | OUTPATIENT
Start: 2019-01-01 | End: 2019-01-01

## 2019-01-01 RX ORDER — ONDANSETRON 2 MG/ML
4 INJECTION INTRAMUSCULAR; INTRAVENOUS EVERY 6 HOURS PRN
Status: CANCELLED | OUTPATIENT
Start: 2019-01-01

## 2019-01-01 RX ORDER — SODIUM CHLORIDE 9 MG/ML
INJECTION, SOLUTION INTRAVENOUS CONTINUOUS
Status: DISCONTINUED | OUTPATIENT
Start: 2019-01-01 | End: 2019-01-01

## 2019-01-01 RX ORDER — BRIMONIDINE TARTRATE 2 MG/ML
1 SOLUTION/ DROPS OPHTHALMIC 3 TIMES DAILY
Status: DISCONTINUED | OUTPATIENT
Start: 2019-01-01 | End: 2019-01-01

## 2019-01-01 RX ORDER — METOCLOPRAMIDE HYDROCHLORIDE 5 MG/ML
10 INJECTION INTRAMUSCULAR; INTRAVENOUS EVERY 8 HOURS PRN
Status: DISCONTINUED | OUTPATIENT
Start: 2019-01-01 | End: 2019-01-01

## 2019-01-01 RX ORDER — METHENAMINE HIPPURATE 1000 MG/1
1 TABLET ORAL 2 TIMES DAILY
Status: DISCONTINUED | OUTPATIENT
Start: 2019-01-01 | End: 2019-01-01

## 2019-01-01 RX ORDER — NITROFURANTOIN 25; 75 MG/1; MG/1
100 CAPSULE ORAL 2 TIMES DAILY
Qty: 14 CAPSULE | Refills: 0 | Status: SHIPPED | OUTPATIENT
Start: 2019-01-01 | End: 2019-01-01

## 2019-01-01 RX ORDER — TACROLIMUS 0.5 MG/1
0.5 CAPSULE ORAL EVERY EVENING
Status: ON HOLD | COMMUNITY
End: 2019-01-01

## 2019-01-01 RX ORDER — TACROLIMUS 0.5 MG/1
0.5 CAPSULE ORAL NIGHTLY
Status: DISCONTINUED | OUTPATIENT
Start: 2019-01-01 | End: 2019-01-01

## 2019-01-01 RX ORDER — ACETAMINOPHEN 650 MG/1
650 SUPPOSITORY RECTAL EVERY 6 HOURS SCHEDULED
Status: DISCONTINUED | OUTPATIENT
Start: 2019-01-01 | End: 2019-01-01

## 2019-01-01 RX ORDER — PREDNISONE 1 MG/1
5 TABLET ORAL DAILY
Status: DISCONTINUED | OUTPATIENT
Start: 2019-01-01 | End: 2019-01-01

## 2019-01-01 RX ORDER — METOCLOPRAMIDE HYDROCHLORIDE 5 MG/ML
5 INJECTION INTRAMUSCULAR; INTRAVENOUS EVERY 8 HOURS PRN
Status: DISCONTINUED | OUTPATIENT
Start: 2019-01-01 | End: 2019-01-01

## 2019-01-01 RX ORDER — TACROLIMUS 1 MG/1
1 CAPSULE ORAL EVERY MORNING
Status: DISCONTINUED | OUTPATIENT
Start: 2019-01-01 | End: 2019-01-01

## 2019-01-01 RX ORDER — MORPHINE SULFATE 4 MG/ML
1 INJECTION, SOLUTION INTRAMUSCULAR; INTRAVENOUS
Status: DISCONTINUED | OUTPATIENT
Start: 2019-01-01 | End: 2019-01-01

## 2019-01-01 RX ORDER — ASPIRIN 81 MG/1
81 TABLET ORAL DAILY
Status: ON HOLD | COMMUNITY
End: 2019-01-01

## 2019-01-01 RX ORDER — DIPHENOXYLATE HYDROCHLORIDE AND ATROPINE SULFATE 2.5; .025 MG/1; MG/1
2 TABLET ORAL 4 TIMES DAILY PRN
Status: DISCONTINUED | OUTPATIENT
Start: 2019-01-01 | End: 2019-01-01

## 2019-01-01 RX ORDER — POTASSIUM CHLORIDE 20 MEQ/1
40 TABLET, EXTENDED RELEASE ORAL ONCE
Status: COMPLETED | OUTPATIENT
Start: 2019-01-01 | End: 2019-01-01

## 2019-01-01 RX ORDER — DIPHENOXYLATE HYDROCHLORIDE AND ATROPINE SULFATE 2.5; .025 MG/1; MG/1
1 TABLET ORAL 4 TIMES DAILY PRN
Status: DISCONTINUED | OUTPATIENT
Start: 2019-01-01 | End: 2019-01-01

## 2019-01-01 RX ORDER — HEPARIN SODIUM 5000 [USP'U]/ML
5000 INJECTION, SOLUTION INTRAVENOUS; SUBCUTANEOUS EVERY 8 HOURS SCHEDULED
Status: DISCONTINUED | OUTPATIENT
Start: 2019-01-01 | End: 2019-01-01

## 2019-01-01 RX ORDER — METHENAMINE HIPPURATE 1000 MG/1
TABLET ORAL
Qty: 60 TABLET | Refills: 0 | OUTPATIENT
Start: 2019-01-01

## 2019-01-01 RX ORDER — MYCOPHENOLIC ACID 360 MG/1
360 TABLET, DELAYED RELEASE ORAL
Status: DISCONTINUED | OUTPATIENT
Start: 2019-01-01 | End: 2019-01-01

## 2019-01-01 RX ORDER — MAGNESIUM SULFATE HEPTAHYDRATE 40 MG/ML
2 INJECTION, SOLUTION INTRAVENOUS ONCE
Status: COMPLETED | OUTPATIENT
Start: 2019-01-01 | End: 2019-01-01

## 2019-01-01 RX ORDER — SODIUM CHLORIDE 9 MG/ML
125 INJECTION, SOLUTION INTRAVENOUS CONTINUOUS
Status: DISCONTINUED | OUTPATIENT
Start: 2019-01-01 | End: 2019-01-01

## 2019-01-01 RX ORDER — SIMVASTATIN 10 MG
TABLET ORAL
Qty: 90 TABLET | Refills: 0 | Status: SHIPPED | OUTPATIENT
Start: 2019-01-01 | End: 2019-01-01

## 2019-01-01 RX ORDER — LATANOPROST 50 UG/ML
1 SOLUTION/ DROPS OPHTHALMIC NIGHTLY
Status: DISCONTINUED | OUTPATIENT
Start: 2019-01-01 | End: 2019-01-01

## 2019-01-01 RX ORDER — DIPHENOXYLATE HYDROCHLORIDE AND ATROPINE SULFATE 2.5; .025 MG/1; MG/1
1 TABLET ORAL
Status: DISCONTINUED | OUTPATIENT
Start: 2019-01-01 | End: 2019-01-01

## 2019-01-01 RX ORDER — ATROPINE SULFATE 10 MG/ML
2 SOLUTION/ DROPS OPHTHALMIC EVERY 2 HOUR PRN
Status: CANCELLED | OUTPATIENT
Start: 2019-01-01

## 2019-01-01 RX ORDER — PREDNISONE 1 MG/1
5 TABLET ORAL
Status: DISCONTINUED | OUTPATIENT
Start: 2019-01-01 | End: 2019-01-01

## 2019-01-01 RX ORDER — PETROLATUM,WHITE/LANOLIN
OINTMENT (GRAM) TOPICAL AS NEEDED
Status: DISCONTINUED | OUTPATIENT
Start: 2019-01-01 | End: 2019-01-01

## 2019-01-01 RX ORDER — VIT A/VIT C/VIT E/ZINC/COPPER 2148-113
1 TABLET ORAL 2 TIMES DAILY
Status: ON HOLD | COMMUNITY
End: 2019-01-01

## 2019-01-01 RX ORDER — TOLVAPTAN 15 MG/1
15 TABLET ORAL ONCE
Status: COMPLETED | OUTPATIENT
Start: 2019-01-01 | End: 2019-01-01

## 2019-01-01 RX ORDER — ACETAMINOPHEN 325 MG/1
650 TABLET ORAL EVERY 6 HOURS PRN
Status: DISCONTINUED | OUTPATIENT
Start: 2019-01-01 | End: 2019-01-01

## 2019-01-01 RX ORDER — LOSARTAN POTASSIUM 50 MG/1
50 TABLET ORAL DAILY
COMMUNITY
End: 2019-01-01

## 2019-01-01 RX ORDER — TACROLIMUS 1 MG/1
1 CAPSULE ORAL DAILY
Status: DISCONTINUED | OUTPATIENT
Start: 2019-01-01 | End: 2019-01-01

## 2019-01-01 RX ORDER — MELATONIN
325
Status: DISCONTINUED | OUTPATIENT
Start: 2019-01-01 | End: 2019-01-01

## 2019-01-01 RX ORDER — METHENAMINE HIPPURATE 1000 MG/1
1 TABLET ORAL 2 TIMES DAILY
Status: ON HOLD | COMMUNITY
End: 2019-01-01

## 2019-01-01 RX ORDER — LOPERAMIDE HYDROCHLORIDE 2 MG/1
2 CAPSULE ORAL
Qty: 90 CAPSULE | Refills: 0 | Status: SHIPPED | OUTPATIENT
Start: 2019-01-01 | End: 2019-01-01 | Stop reason: ALTCHOICE

## 2019-01-01 RX ORDER — ACETAMINOPHEN 160 MG/5ML
650 SOLUTION ORAL EVERY 6 HOURS SCHEDULED
Status: DISCONTINUED | OUTPATIENT
Start: 2019-01-01 | End: 2019-01-01

## 2019-01-01 RX ORDER — LORAZEPAM 2 MG/ML
2 INJECTION INTRAMUSCULAR EVERY 4 HOURS PRN
Status: DISCONTINUED | OUTPATIENT
Start: 2019-01-01 | End: 2019-01-01

## 2019-01-01 RX ORDER — SCOLOPAMINE TRANSDERMAL SYSTEM 1 MG/1
1 PATCH, EXTENDED RELEASE TRANSDERMAL
Status: DISCONTINUED | OUTPATIENT
Start: 2019-01-01 | End: 2019-01-01

## 2019-01-01 RX ORDER — ACETAMINOPHEN 160 MG/5ML
650 SOLUTION ORAL EVERY 4 HOURS PRN
Status: DISCONTINUED | OUTPATIENT
Start: 2019-01-01 | End: 2019-01-01

## 2019-01-01 RX ORDER — ONDANSETRON 2 MG/ML
4 INJECTION INTRAMUSCULAR; INTRAVENOUS EVERY 6 HOURS PRN
Status: DISCONTINUED | OUTPATIENT
Start: 2019-01-01 | End: 2019-01-01

## 2019-01-01 RX ORDER — ASPIRIN 81 MG/1
81 TABLET ORAL DAILY
Status: DISCONTINUED | OUTPATIENT
Start: 2019-01-01 | End: 2019-01-01

## 2019-01-01 RX ORDER — LOPERAMIDE HYDROCHLORIDE 2 MG/1
2 CAPSULE ORAL
Status: DISCONTINUED | OUTPATIENT
Start: 2019-01-01 | End: 2019-01-01

## 2019-01-01 RX ORDER — HYDRALAZINE HYDROCHLORIDE 20 MG/ML
10 INJECTION INTRAMUSCULAR; INTRAVENOUS EVERY 6 HOURS PRN
Status: DISCONTINUED | OUTPATIENT
Start: 2019-01-01 | End: 2019-01-01

## 2019-01-01 RX ORDER — ONDANSETRON 2 MG/ML
4 INJECTION INTRAMUSCULAR; INTRAVENOUS EVERY 4 HOURS PRN
Status: DISCONTINUED | OUTPATIENT
Start: 2019-01-01 | End: 2019-01-01

## 2019-01-01 RX ORDER — TIMOLOL MALEATE 5 MG/ML
1 SOLUTION/ DROPS OPHTHALMIC 2 TIMES DAILY
Status: DISCONTINUED | OUTPATIENT
Start: 2019-01-01 | End: 2019-01-01

## 2019-01-01 RX ORDER — MELATONIN
325
Qty: 30 TABLET | Refills: 2 | Status: ON HOLD | OUTPATIENT
Start: 2019-01-01 | End: 2019-01-01

## 2019-01-01 RX ORDER — METHENAMINE HIPPURATE 1000 MG/1
TABLET ORAL
Qty: 60 TABLET | Refills: 5 | Status: SHIPPED | OUTPATIENT
Start: 2019-01-01 | End: 2019-01-01 | Stop reason: CLARIF

## 2019-01-01 RX ORDER — AZATHIOPRINE 50 MG/1
50 TABLET ORAL DAILY
Status: DISCONTINUED | OUTPATIENT
Start: 2019-01-01 | End: 2019-01-01

## 2019-01-01 RX ORDER — LOPERAMIDE HYDROCHLORIDE 2 MG/1
2 CAPSULE ORAL 4 TIMES DAILY PRN
Status: DISCONTINUED | OUTPATIENT
Start: 2019-01-01 | End: 2019-01-01

## 2019-01-01 RX ORDER — SIMVASTATIN 10 MG
10 TABLET ORAL NIGHTLY
COMMUNITY
End: 2019-01-01

## 2019-01-01 RX ORDER — DIPHENOXYLATE HYDROCHLORIDE AND ATROPINE SULFATE 2.5; .025 MG/1; MG/1
2 TABLET ORAL
Qty: 84 TABLET | Refills: 0 | Status: SHIPPED | OUTPATIENT
Start: 2019-01-01 | End: 2019-01-01

## 2019-01-01 RX ORDER — MELOXICAM 7.5 MG/1
TABLET ORAL
Qty: 40 TABLET | Refills: 0 | Status: SHIPPED | OUTPATIENT
Start: 2019-01-01 | End: 2019-01-01

## 2019-01-01 RX ORDER — METRONIDAZOLE 500 MG/100ML
500 INJECTION, SOLUTION INTRAVENOUS EVERY 8 HOURS
Status: DISCONTINUED | OUTPATIENT
Start: 2019-01-01 | End: 2019-01-01

## 2019-01-01 RX ORDER — TACROLIMUS 1 MG/1
1 CAPSULE ORAL DAILY
Status: ON HOLD | COMMUNITY
End: 2019-01-01

## 2019-01-01 RX ORDER — SODIUM CHLORIDE 9 MG/ML
INJECTION, SOLUTION INTRAVENOUS CONTINUOUS
Status: ACTIVE | OUTPATIENT
Start: 2019-01-01 | End: 2019-01-01

## 2019-01-01 RX ORDER — TOLVAPTAN 30 MG/1
30 TABLET ORAL ONCE
Status: COMPLETED | OUTPATIENT
Start: 2019-01-01 | End: 2019-01-01

## 2019-01-01 RX ORDER — BRIMONIDINE TARTRATE 2 MG/ML
1 SOLUTION/ DROPS OPHTHALMIC 2 TIMES DAILY
Status: DISCONTINUED | OUTPATIENT
Start: 2019-01-01 | End: 2019-01-01

## 2019-01-01 RX ORDER — HYDRALAZINE HYDROCHLORIDE 50 MG/1
50 TABLET, FILM COATED ORAL EVERY 8 HOURS SCHEDULED
Status: DISCONTINUED | OUTPATIENT
Start: 2019-01-01 | End: 2019-01-01

## 2019-01-01 RX ORDER — ACETAMINOPHEN 650 MG/1
650 SUPPOSITORY RECTAL EVERY 4 HOURS PRN
Status: DISCONTINUED | OUTPATIENT
Start: 2019-01-01 | End: 2019-01-01

## 2019-01-01 RX ORDER — HYDRALAZINE HYDROCHLORIDE 100 MG/1
100 TABLET, FILM COATED ORAL 2 TIMES DAILY
COMMUNITY
End: 2019-01-01

## 2019-01-01 RX ORDER — MELOXICAM 7.5 MG/1
TABLET ORAL
Qty: 180 TABLET | Refills: 0 | OUTPATIENT
Start: 2019-01-01

## 2019-01-01 RX ORDER — METHENAMINE HIPPURATE 1000 MG/1
1 TABLET ORAL 2 TIMES DAILY
COMMUNITY
End: 2019-01-01

## 2019-01-01 RX ORDER — ONDANSETRON 4 MG/1
4 TABLET, ORALLY DISINTEGRATING ORAL EVERY 6 HOURS PRN
Status: DISCONTINUED | OUTPATIENT
Start: 2019-01-01 | End: 2019-01-01

## 2019-01-01 RX ORDER — BRIMONIDINE TARTRATE 2 MG/ML
1 SOLUTION/ DROPS OPHTHALMIC 3 TIMES DAILY
Status: DISCONTINUED | OUTPATIENT
Start: 2019-01-01 | End: 2019-01-01 | Stop reason: SDUPTHER

## 2019-01-01 RX ORDER — ONDANSETRON 2 MG/ML
4 INJECTION INTRAMUSCULAR; INTRAVENOUS ONCE
Status: COMPLETED | OUTPATIENT
Start: 2019-01-01 | End: 2019-01-01

## 2019-01-01 RX ORDER — MORPHINE SULFATE 4 MG/ML
2 INJECTION, SOLUTION INTRAMUSCULAR; INTRAVENOUS EVERY 2 HOUR PRN
Status: DISCONTINUED | OUTPATIENT
Start: 2019-01-01 | End: 2019-01-01

## 2019-01-01 RX ORDER — TACROLIMUS 0.5 MG/1
1 CAPSULE ORAL 2 TIMES DAILY
Status: DISCONTINUED | OUTPATIENT
Start: 2019-01-01 | End: 2019-01-01

## 2019-01-01 RX ORDER — CHOLESTYRAMINE LIGHT 4 G/5.7G
4 POWDER, FOR SUSPENSION ORAL DAILY
Qty: 14 PACKET | Refills: 0 | Status: SHIPPED | OUTPATIENT
Start: 2019-01-01 | End: 2019-01-01

## 2019-01-01 RX ORDER — MORPHINE SULFATE 4 MG/ML
1 INJECTION, SOLUTION INTRAMUSCULAR; INTRAVENOUS EVERY 2 HOUR PRN
Status: DISCONTINUED | OUTPATIENT
Start: 2019-01-01 | End: 2019-01-01

## 2019-01-01 RX ORDER — LOSARTAN POTASSIUM 50 MG/1
TABLET ORAL
Qty: 90 TABLET | Refills: 0 | OUTPATIENT
Start: 2019-01-01

## 2019-01-01 RX ORDER — HALOPERIDOL 5 MG/ML
2 INJECTION INTRAMUSCULAR
Status: DISCONTINUED | OUTPATIENT
Start: 2019-01-01 | End: 2019-01-01

## 2019-01-01 RX ORDER — MAGNESIUM OXIDE 400 MG (241.3 MG MAGNESIUM) TABLET
800 TABLET ONCE
Status: COMPLETED | OUTPATIENT
Start: 2019-01-01 | End: 2019-01-01

## 2019-01-01 RX ORDER — MORPHINE SULFATE 4 MG/ML
4 INJECTION, SOLUTION INTRAMUSCULAR; INTRAVENOUS EVERY 2 HOUR PRN
Status: DISCONTINUED | OUTPATIENT
Start: 2019-01-01 | End: 2019-01-01

## 2019-01-01 RX ORDER — SIMVASTATIN 20 MG
TABLET ORAL
Qty: 90 TABLET | Refills: 0 | OUTPATIENT
Start: 2019-01-01

## 2019-01-01 RX ORDER — SPIRONOLACTONE 25 MG/1
12.5 TABLET ORAL DAILY
COMMUNITY
End: 2019-01-01

## 2019-01-01 RX ORDER — DIPHENHYDRAMINE HYDROCHLORIDE 50 MG/ML
25 INJECTION INTRAMUSCULAR; INTRAVENOUS EVERY 6 HOURS PRN
Status: DISCONTINUED | OUTPATIENT
Start: 2019-01-01 | End: 2019-01-01

## 2019-01-01 RX ORDER — LATANOPROST 50 UG/ML
1 SOLUTION/ DROPS OPHTHALMIC NIGHTLY
Status: ON HOLD | COMMUNITY
End: 2019-01-01

## 2019-01-01 RX ORDER — LORAZEPAM 2 MG/ML
0.5 INJECTION INTRAMUSCULAR EVERY 4 HOURS PRN
Status: DISCONTINUED | OUTPATIENT
Start: 2019-01-01 | End: 2019-01-01

## 2019-01-01 RX ORDER — MAGNESIUM HYDROXIDE/ALUMINUM HYDROXICE/SIMETHICONE 120; 1200; 1200 MG/30ML; MG/30ML; MG/30ML
30 SUSPENSION ORAL AS NEEDED
Status: DISCONTINUED | OUTPATIENT
Start: 2019-01-01 | End: 2019-01-01

## 2019-01-01 RX ORDER — GLIMEPIRIDE 1 MG/1
1 TABLET ORAL
Status: DISCONTINUED | OUTPATIENT
Start: 2019-01-01 | End: 2019-01-01

## 2019-01-01 RX ORDER — MAGNESIUM HYDROXIDE/ALUMINUM HYDROXICE/SIMETHICONE 120; 1200; 1200 MG/30ML; MG/30ML; MG/30ML
30 SUSPENSION ORAL 4 TIMES DAILY PRN
Status: DISCONTINUED | OUTPATIENT
Start: 2019-01-01 | End: 2019-01-01

## 2019-01-01 RX ORDER — PANTOPRAZOLE SODIUM 40 MG/1
40 TABLET, DELAYED RELEASE ORAL
Qty: 30 TABLET | Refills: 0 | Status: SHIPPED | OUTPATIENT
Start: 2019-01-01 | End: 2019-01-01

## 2019-01-01 RX ORDER — ENOXAPARIN SODIUM 100 MG/ML
40 INJECTION SUBCUTANEOUS NIGHTLY
Status: DISCONTINUED | OUTPATIENT
Start: 2019-01-01 | End: 2019-01-01

## 2019-01-01 RX ORDER — ONDANSETRON 4 MG/1
4 TABLET, ORALLY DISINTEGRATING ORAL EVERY 4 HOURS PRN
Qty: 10 TABLET | Refills: 0 | Status: ON HOLD | OUTPATIENT
Start: 2019-01-01 | End: 2019-01-01

## 2019-01-01 RX ORDER — LORAZEPAM 2 MG/ML
1 INJECTION INTRAMUSCULAR EVERY 4 HOURS PRN
Status: DISCONTINUED | OUTPATIENT
Start: 2019-01-01 | End: 2019-01-01

## 2019-01-01 RX ORDER — GLYCOPYRROLATE 0.2 MG/ML
0.4 INJECTION, SOLUTION INTRAMUSCULAR; INTRAVENOUS
Status: DISCONTINUED | OUTPATIENT
Start: 2019-01-01 | End: 2019-01-01

## 2019-01-01 RX ORDER — SOD PHOS DI, MONO/K PHOS MONO 250 MG
2 TABLET ORAL
Status: ON HOLD | COMMUNITY
Start: 2019-01-01 | End: 2019-01-01

## 2019-01-01 RX ORDER — PANTOPRAZOLE SODIUM 40 MG/1
40 TABLET, DELAYED RELEASE ORAL
Qty: 30 TABLET | Refills: 0 | Status: ON HOLD | OUTPATIENT
Start: 2019-01-01 | End: 2019-01-01

## 2019-01-01 RX ORDER — ERGOCALCIFEROL 1.25 MG/1
CAPSULE ORAL
Qty: 3 CAPSULE | Refills: 0 | Status: SHIPPED | OUTPATIENT
Start: 2019-01-01 | End: 2019-01-01 | Stop reason: ALTCHOICE

## 2019-01-01 RX ORDER — BISACODYL 10 MG
10 SUPPOSITORY, RECTAL RECTAL
Status: DISCONTINUED | OUTPATIENT
Start: 2019-01-01 | End: 2019-01-01

## 2019-01-01 RX ORDER — GLIMEPIRIDE 1 MG/1
1 TABLET ORAL
Qty: 30 TABLET | Refills: 0 | Status: ON HOLD | OUTPATIENT
Start: 2019-01-01 | End: 2019-01-01

## 2019-01-01 RX ORDER — TACROLIMUS 1 MG/1
2 CAPSULE ORAL EVERY MORNING
Status: DISCONTINUED | OUTPATIENT
Start: 2019-01-01 | End: 2019-01-01

## 2019-01-01 RX ORDER — POTASSIUM CHLORIDE 20 MEQ/1
20 TABLET, EXTENDED RELEASE ORAL DAILY
Status: DISCONTINUED | OUTPATIENT
Start: 2019-01-01 | End: 2019-01-01

## 2019-01-01 RX ORDER — SODIUM CHLORIDE 0.9 % (FLUSH) 0.9 %
10 SYRINGE (ML) INJECTION EVERY 12 HOURS
Status: DISCONTINUED | OUTPATIENT
Start: 2019-01-01 | End: 2019-01-01

## 2019-01-01 RX ORDER — LOPERAMIDE HYDROCHLORIDE 2 MG/1
2 CAPSULE ORAL
Qty: 90 CAPSULE | Refills: 0 | Status: SHIPPED | OUTPATIENT
Start: 2019-01-01 | End: 2019-01-01

## 2019-01-01 RX ORDER — LOSARTAN POTASSIUM 50 MG/1
TABLET ORAL
Qty: 90 TABLET | Refills: 0 | Status: SHIPPED | OUTPATIENT
Start: 2019-01-01 | End: 2019-01-01 | Stop reason: CLARIF

## 2019-01-01 RX ORDER — FUROSEMIDE 10 MG/ML
20 INJECTION INTRAMUSCULAR; INTRAVENOUS ONCE
Status: COMPLETED | OUTPATIENT
Start: 2019-01-01 | End: 2019-01-01

## 2019-01-01 RX ORDER — HYDRALAZINE HYDROCHLORIDE 50 MG/1
100 TABLET, FILM COATED ORAL 2 TIMES DAILY
Status: DISCONTINUED | OUTPATIENT
Start: 2019-01-01 | End: 2019-01-01

## 2019-01-01 RX ORDER — PRAVASTATIN SODIUM 20 MG
20 TABLET ORAL NIGHTLY
Status: DISCONTINUED | OUTPATIENT
Start: 2019-01-01 | End: 2019-01-01

## 2019-01-01 RX ORDER — SCOLOPAMINE TRANSDERMAL SYSTEM 1 MG/1
1 PATCH, EXTENDED RELEASE TRANSDERMAL
Status: CANCELLED | OUTPATIENT
Start: 2019-08-18

## 2019-01-01 RX ORDER — AZATHIOPRINE 50 MG/1
50 TABLET ORAL DAILY
Qty: 30 TABLET | Refills: 11 | Status: ON HOLD | OUTPATIENT
Start: 2019-01-01 | End: 2019-01-01

## 2019-01-01 RX ORDER — TACROLIMUS 0.5 MG/1
0.5 CAPSULE ORAL EVERY EVENING
Status: DISCONTINUED | OUTPATIENT
Start: 2019-01-01 | End: 2019-01-01

## 2019-01-01 RX ORDER — TACROLIMUS 1 MG/1
1 CAPSULE ORAL EVERY EVENING
Status: DISCONTINUED | OUTPATIENT
Start: 2019-01-01 | End: 2019-01-01

## 2019-01-01 RX ORDER — DEXTROSE MONOHYDRATE 25 G/50ML
50 INJECTION, SOLUTION INTRAVENOUS
Status: DISCONTINUED | OUTPATIENT
Start: 2019-01-01 | End: 2019-01-01

## 2019-01-01 RX ORDER — MORPHINE SULFATE 4 MG/ML
1 INJECTION, SOLUTION INTRAMUSCULAR; INTRAVENOUS
Status: CANCELLED | OUTPATIENT
Start: 2019-01-01

## 2019-01-01 RX ORDER — GARLIC EXTRACT 500 MG
1 CAPSULE ORAL DAILY
Status: DISCONTINUED | OUTPATIENT
Start: 2019-01-01 | End: 2019-01-01

## 2019-01-01 RX ORDER — ACETAMINOPHEN 325 MG/1
650 TABLET ORAL ONCE
Status: COMPLETED | OUTPATIENT
Start: 2019-01-01 | End: 2019-01-01

## 2019-01-01 RX ORDER — SPIRONOLACTONE 25 MG/1
TABLET ORAL
Qty: 90 TABLET | Refills: 0 | Status: SHIPPED | OUTPATIENT
Start: 2019-01-01 | End: 2019-01-01

## 2019-01-01 RX ORDER — AZATHIOPRINE 50 MG/1
100 TABLET ORAL DAILY
Status: DISCONTINUED | OUTPATIENT
Start: 2019-01-01 | End: 2019-01-01

## 2019-01-01 RX ORDER — HALOPERIDOL 5 MG/ML
1 INJECTION INTRAMUSCULAR
Status: DISCONTINUED | OUTPATIENT
Start: 2019-01-01 | End: 2019-01-01

## 2019-01-01 RX ORDER — METHENAMINE HIPPURATE 1000 MG/1
TABLET ORAL
Qty: 60 TABLET | Refills: 0 | Status: SHIPPED | OUTPATIENT
Start: 2019-01-01 | End: 2019-01-01

## 2019-01-01 RX ORDER — DIPHENOXYLATE HYDROCHLORIDE AND ATROPINE SULFATE 2.5; .025 MG/1; MG/1
1 TABLET ORAL 4 TIMES DAILY PRN
Qty: 120 TABLET | Refills: 0 | Status: SHIPPED | OUTPATIENT
Start: 2019-01-01 | End: 2019-01-01

## 2019-01-01 RX ORDER — CHOLESTYRAMINE LIGHT 4 G/5.7G
4 POWDER, FOR SUSPENSION ORAL DAILY
Status: DISCONTINUED | OUTPATIENT
Start: 2019-01-01 | End: 2019-01-01

## 2019-01-01 RX ORDER — HYDRALAZINE HYDROCHLORIDE 50 MG/1
100 TABLET, FILM COATED ORAL EVERY 8 HOURS SCHEDULED
Status: DISCONTINUED | OUTPATIENT
Start: 2019-01-01 | End: 2019-01-01

## 2019-01-01 RX ORDER — SIMVASTATIN 10 MG
TABLET ORAL
Qty: 90 TABLET | Refills: 0 | Status: ON HOLD | OUTPATIENT
Start: 2019-01-01 | End: 2019-01-01

## 2019-01-01 RX ORDER — DIPHENOXYLATE HYDROCHLORIDE AND ATROPINE SULFATE 2.5; .025 MG/1; MG/1
2 TABLET ORAL
Qty: 84 TABLET | Refills: 0 | Status: ON HOLD | OUTPATIENT
Start: 2019-01-01 | End: 2019-01-01

## 2019-01-01 RX ORDER — HEPARIN SODIUM 5000 [USP'U]/ML
5000 INJECTION, SOLUTION INTRAVENOUS; SUBCUTANEOUS EVERY 12 HOURS
Status: DISCONTINUED | OUTPATIENT
Start: 2019-01-01 | End: 2019-01-01

## 2019-01-01 RX ORDER — VITS A,C,E/LUTEIN/MINERALS 300MCG-200
1 TABLET ORAL 2 TIMES DAILY
Status: DISCONTINUED | OUTPATIENT
Start: 2019-01-01 | End: 2019-01-01

## 2019-01-01 RX ORDER — METOCLOPRAMIDE HYDROCHLORIDE 5 MG/ML
5 INJECTION INTRAMUSCULAR; INTRAVENOUS 3 TIMES DAILY
Status: DISCONTINUED | OUTPATIENT
Start: 2019-01-01 | End: 2019-01-01

## 2019-01-01 RX ORDER — TACROLIMUS 0.5 MG/1
1 CAPSULE ORAL EVERY MORNING
Status: DISCONTINUED | OUTPATIENT
Start: 2019-01-01 | End: 2019-01-01

## 2019-01-04 NOTE — PROGRESS NOTES
Nephrology Progress Note      ASSESSMENT/PLAN:        1) s/p kidney transplant- at [facility] in 2009 for polycystic kidney disease; was on dialysis for 2 years in [facility].  Her donor was a 70-year-old male from [facility]; there was no delayed graft funct otherwise been quite healthy other than having polycystic kidney disease which inherited from her father. Her father was on dialysis before he ultimately passed.   She has had abdominal aortic aneurysm repair as well as a stent in her right coronary artery Rfl: 0   SIMVASTATIN 10 MG Oral Tab TAKE 1 TABLET BY MOUTH EVERY DAY IN THE EVENING Disp: 90 tablet Rfl: 0   Estrogens, Conjugated (PREMARIN) 0.625 MG/GM Vaginal Cream Apply 1 gm MWF Disp: 30 g Rfl: 0   Brimonidine Tartrate-Timolol (COMBIGAN) 0.2-0.5 % Oph Exercise: Yes        Seat Belt: Yes        Special Diet: Yes          diabetic diet         Stress Concern: No        Weight Concern: No       Family History:  Family History   Problem Relation Age of Onset   • Stroke Mother    • Heart Disease Maternal Gr

## 2019-01-07 ENCOUNTER — MYAURORA ACCOUNT LINK (OUTPATIENT)
Dept: OTHER | Age: 79
End: 2019-01-07

## 2019-01-11 ENCOUNTER — PRIOR ORIGINAL RECORDS (OUTPATIENT)
Dept: OTHER | Age: 79
End: 2019-01-11

## 2019-01-29 NOTE — TELEPHONE ENCOUNTER
Left VM for pt- US without obstruction, mass, etc; only renal and liver cysts due to PKD- thx cyndig

## 2019-01-31 NOTE — TELEPHONE ENCOUNTER
Approved per protocol  Simvastatin 10 MG  Last OV relevant to medication: 10-3-18  Last refill date: 10/29/18 #/refills: 0  When pt was asked to return for OV: none  Upcoming appt/reason: 4-8-19  Recent labs: 12-17-18: Vit.  D/ BMP/ Albumin/ Phosphorus/ Mag

## 2019-02-03 NOTE — ED NOTES
No bleeding noted to suture site. No pus, drainage noted. Suture sites intact. Pt states she feels better. Bruising noted to left arm.

## 2019-02-03 NOTE — ED NOTES
Patient states she was seen at [facility] for laceration repair to left hair and rib fx. Pt states her laceration has been oozing through her sutures. Pt states she is on asa .

## 2019-02-03 NOTE — ED PROVIDER NOTES
Patient Seen in: [facility] In [place]    History   Patient presents with:  Laceration Abrasion (integumentary)    Stated Complaint: suture site is bleeding; pt denies taking blood thinners    HPI    67-year-old female presents report above.    Physical Exam     ED Triage Vitals [02/02/19 2109]   /53   Pulse 72   Resp 18   Temp 98.2 °F (36.8 °C)   Temp src Temporal   SpO2 97 %   O2 Device        Current:/53   Pulse 72   Temp 98.2 °F (36.8 °C) (Temporal)   Resp 18   Ht 157.5

## 2019-02-03 NOTE — ED INITIAL ASSESSMENT (HPI)
Pt, who sustained a left arm lac after falling on 1/31/19, sts the suture site keeps bleeding. She denies taking blood thinners. She has a closed-off fistula in the same arm.     *Side note: she also has 3 rib fractures from the same fall

## 2019-02-04 NOTE — ED PROVIDER NOTES
Patient Seen in: [facility]    History   Patient presents with:  Wound Recheck    Stated Complaint: wound check    HPI  67-year-old patient presents for a wound check to a laceration to her left upper arm from a piece of glass t Current:BP (!) 163/75   Pulse 82   Temp 98.2 °F (36.8 °C) (Oral)   Resp 18   SpO2 99%         Physical Exam   Constitutional: She is oriented to person, place, and time. She appears well-developed and well-nourished.    Cardiovascular: Normal rate, regular

## 2019-02-11 NOTE — PROGRESS NOTES
CHIEF COMPLAINT:     Patient presents with:  Suture Removal: left upper arm      HPI:   True Mayela is a 67year old female .   The patient presents for suture removal. Pt had sutures placed 1/31/19 which was 11 days ago at [address] mouth daily. Disp:  Rfl:    Mycophenolate Sodium (MYFORTIC) 180 MG Oral Tab EC Take 360 mg by mouth 2 (two) times daily. Disp:  Rfl:    Metoprolol Succinate ER (TOPROL XL) 25 MG Oral Tablet 24 Hr Take 50 mg by mouth daily.    Disp:  Rfl:    hydrochlorothi scabbing is occurring, no drg, or bleeding. Skin is very thin in appearance. Lower fistula is not active with no swelling or bleeding. Some faded bruising also noted of the upper arm.  Area dressed with neosporin, a telfa pad, and gauze  LYMPH:  no lymphade

## 2019-02-13 NOTE — TELEPHONE ENCOUNTER
Approved per protocol  Losartan Potassium  Last OV relevant to medication: 10-3-18  Last refill date: 11-16-18   #/refills: 0  When pt was asked to return for OV: none  Upcoming appt/reason: 4-8-19  Recent labs: 12-17-18: Vit. D/ BMP/ Albumin serum/ Phospho

## 2019-02-14 NOTE — PROGRESS NOTES
CHIEF COMPLAINT:     Patient presents with:  Arm Pain      HPI:   Karine Colon is a 67year old female who c/o left arm pain.  Pt had fallen on 1/31/19 when she tripped over a bed post and landed on a desk fracturing 3 ribs and sustaining a laceration to mouth daily. Disp:  Rfl:    tacrolimus (PROGRAF) 1 MG Oral Cap Take 1 mg by mouth 2 (two) times daily. Disp:  Rfl:    predniSONE (DELTASONE) 5 MG Oral Tab Take 5 mg by mouth daily.  Disp:  Rfl:    Mycophenolate Sodium (MYFORTIC) 180 MG Oral Tab EC Take 36 strength of left hand 5/5    Physical Exam    ASSESSMENT AND PLAN:     1. Left arm pain  2. Fall, subsequent encounter  - Will check for Fx as pt states no xray done at time of injury  - If negative discussed tylenol and ice to the area.  Avoid straining or

## 2019-02-28 VITALS
DIASTOLIC BLOOD PRESSURE: 60 MMHG | BODY MASS INDEX: 24.1 KG/M2 | HEART RATE: 60 BPM | WEIGHT: 136 LBS | SYSTOLIC BLOOD PRESSURE: 110 MMHG | HEIGHT: 63 IN

## 2019-02-28 NOTE — TELEPHONE ENCOUNTER
Methanamine Hippurate last filled 11/30/18 #60 with 2 refills    Simvastatin last filled 1/31/19 #90 ( TOO SOON)    RTC -  None

## 2019-03-23 NOTE — ED NOTES
Patient resting in bed, family at the bedside. Patient reports she feels a little better at this time.

## 2019-03-23 NOTE — ED PROVIDER NOTES
Patient Seen in: [facility] Emergency Department    History   Patient presents with:  Nausea/Vomiting/Diarrhea (gastrointestinal)    Stated Complaint: n/v/d     HPI    Patient complains of multiple episodes of diarrhea along with one episode of vomi Device None (Room air)       Current:/73   Pulse 60   Temp 98.1 °F (36.7 °C) (Oral)   Resp 16   Ht 160 cm (5' 3\")   Wt 60.8 kg   SpO2 97%   BMI 23.74 kg/m²         Physical Exam   Constitutional: She is oriented to person, place, and time.  She appea CBC W/ DIFFERENTIAL[[id number]]          Abnormal            Final result                 Please view results for these tests on the individual orders.    URINALYSIS WITH CULTURE REFLEX   RAINBOW DRAW BLUE   RAINBOW DRAW LAVENDER   RAINBOW DRAW LIGHT GRE

## 2019-03-25 NOTE — TELEPHONE ENCOUNTER
No don't give it. Can you check with the pharmacy and see if there is one that does not interfere with the glaucoma.

## 2019-03-25 NOTE — PROGRESS NOTES
CHIEF COMPLAINT:     Patient presents with:  ER F/U: Pt has had flu like symptoms for the past 5-6 days. Pt was in ER 2 days. Pt states she vomitted once, not eating well. Pt is very weak. no diarrhea.  Pt is having side and stomach pain states it is an ach tablet (4 mg total) by mouth every 4 (four) hours as needed for Nausea.  Disp: 10 tablet Rfl: 0   SPIRONOLACTONE 25 MG Oral Tab TAKE ONE-HALF TABLET BY MOUTH DAILY Disp: 90 tablet Rfl: 0   SIMVASTATIN 10 MG Oral Tab TAKE 1 TABLET BY MOUTH EVERY DAY IN THE E Diagnosis Date   • A-V fistula (HCC)     left arm-CLOSED   • Cancer (HCC)    • Cataract     both eyes   • Facial basal cell cancer    • Glaucoma     left   • Other and unspecified hyperlipidemia    • Polycystic kidney disease    • Rectal cancer ([facility] [address] with pain,  + lumbar spine paraspinal muscle tension, has tenderness in the lower spine area. Neuro: No focal deficits. , Normal sensation. 2+ patellar and achilles DTR's b/l.        Physical Exam    ASSESSMENT AND PLAN:     Chronic low back pain without s

## 2019-03-26 NOTE — TELEPHONE ENCOUNTER
Pharmacy would like a call back states that pt daughter let them know pt is not allowed to be on this medication

## 2019-03-26 NOTE — TELEPHONE ENCOUNTER
Talked to pt. To get clarification why Pt cannot use meloxicam.   Hever Deleon (daughter) states that mom isn't allowed to take Ibuprofen due to kidney transplant. Pt is only allowed to use Tylenol. In Problems to be reconciled (Complications of Transplanted Kidney).

## 2019-03-26 NOTE — TELEPHONE ENCOUNTER
Pharmacy would like a call back needs clarification on Glucose Blood (MCKINLEY CONTOUR TEST) In Vitro Strip

## 2019-03-27 PROBLEM — R19.7 DIARRHEA, UNSPECIFIED TYPE: Status: ACTIVE | Noted: 2019-01-01

## 2019-03-27 PROBLEM — N30.00 ACUTE CYSTITIS WITHOUT HEMATURIA: Status: ACTIVE | Noted: 2019-01-01

## 2019-03-27 PROBLEM — R79.89 AZOTEMIA: Status: ACTIVE | Noted: 2019-01-01

## 2019-03-27 PROBLEM — E87.2 METABOLIC ACIDOSIS: Status: ACTIVE | Noted: 2019-01-01

## 2019-03-27 PROBLEM — E87.20 METABOLIC ACIDOSIS: Status: ACTIVE | Noted: 2019-01-01

## 2019-03-27 PROBLEM — R73.9 HYPERGLYCEMIA: Status: ACTIVE | Noted: 2019-01-01

## 2019-03-27 NOTE — ED PROVIDER NOTES
Patient Seen in: [facility] Emergency Department    History   Patient presents with:  Nausea/Vomiting/Diarrhea (gastrointestinal)  Dehydration (metabolic/constitutional)    Stated Complaint: dehydration, fatigue, no appetite, diarrhea    [employer]    Positive for stated complaint: dehydration, fatigue, no appetite, diarrhea  Other systems are as noted in HPI. Constitutional and vital signs reviewed. All other systems reviewed and negative except as noted above.     Physical Exam     ED DIFFERENTIAL WITH PLATELET    Narrative: The following orders were created for panel order CBC WITH DIFFERENTIAL WITH PLATELET.   Procedure                               Abnormality         Status                     --------- measuring 3.3 cm. BILIARY:  Nondistended gallbladder. No biliary dilatation. PANCREAS:  Uniform parenchyma. SPLEEN:  Not enlarged. KIDNEYS:  Normal anatomic positions.   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 This can be seen with enteritis. 2.  Colonic diverticulosis. No acute diverticulitis. 3.  Changes of autosomal dominant polycystic kidney disease. No hydronephrosis.  4.  2 cysts are seen in the right iliac fossa tra Hyperglycemia R73.9 9/86/0660 Yes    Metabolic acidosis R26.6 3/24/6568 Unknown

## 2019-03-27 NOTE — ED NOTES
Daughter stating pt is becoming increasingly anxious about diarrhea. Pt has been seen in er on Saturday and PMD Monday and is still not better. Pt concerned diarrhea is not going away and told daughter she was going to take an ambulance to ER.  Daughter did

## 2019-03-27 NOTE — TELEPHONE ENCOUNTER
Spoke with pharmacy and notified them per SM pt to take tylenol. Pharmacy verbalized understanding. Pt is currently in ER. Will follow up with pt after.

## 2019-03-27 NOTE — ED INITIAL ASSESSMENT (HPI)
Pt here stating intermittent diarrhea for 1wk. Pt was seen Saturday for diarrhea and dx with virus. Pt stating she did vomit Sunday. Pt stating she stopped drinking this morning so she would stop having diarrhea. Yesterday 10 episodes of diarrhea.  Saw PMD

## 2019-03-27 NOTE — TELEPHONE ENCOUNTER
Mike Gonzalez from [address] calling in, to follow up on message below.     Please call Pharmacy at the earliest convenience @ [phone]

## 2019-03-27 NOTE — TELEPHONE ENCOUNTER
[address] called back and stated that a nurse needs to contact Medicare to get a diagnosis code for the patient's Glucose Blood Que Contour test in vitro strip. Please call Medicare at [phone]. Lilli call back number [phone].  Hammad

## 2019-03-28 NOTE — DIETARY NOTE
[address]     Admitting diagnosis:  Metabolic acidosis [I55.3]  Acute cystitis without hematuria [N30.00]  Diarrhea, unspecified type [R19.7]    Ht: 160 cm (5' 3\")  Wt: 59 kg (130 lb 1.6 oz).  This is 113 % of

## 2019-03-28 NOTE — CONSULTS
[facility]                       [facility]astroenterology Consultation[facility]    Blenda Givens Patient Status:  Observation    1940 MRN [id number]   [facility] 4NW-A Attending Alonso Henderson MD   Hosp Day # 0 PCP Selvin Self (abdominal aortic aneurysm) (HCC)    • Back problem    • Cancer ([address]    • Cataract     both eyes   • Coronary atherosclerosis    • Diabetes ([facility] [address]    • Disorder of liver     polycystic cyst in Liver, part of the liver removed 1994   • Facial basal cell can injection 5,000 Units 5,000 Units Subcutaneous Q8H 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 There is moderate colonic diverticulosis. There are fluid filled, non pathologically distended loops   of small bowel. There is no ascites. ABDOMINAL WALL:  There is mild ventral eventration/laxity. No focal hernia. s/p AAA repair, DM, s/p renal transplant (2009; Mary Nix), and squamous cell carcinoma in situ of the anus (2017) admitted yesterday with 9 days of watery diarrhea after her grandchildren experienced a similar illness which self resolved.  C Diff negative, no f

## 2019-03-28 NOTE — PROGRESS NOTES
NURSING ADMISSION NOTE      Patient admitted via Cart  Oriented to room. Safety precautions initiated. Bed in low position. Call light in reach. Pt arrived to room 432 without incident. Alert/oriented x 4.   Reports having diarrhea for one week, pr

## 2019-03-28 NOTE — PROGRESS NOTES
03/28/19 1117   Clinical Encounter Type   Visited With Patient   Sacramental Encounters   Sacrament of Sick-Anointing Anointed   The patient was seen by Cyril Brito. Received prayer, Scripture, support and Sacrament of the Sick.

## 2019-03-28 NOTE — H&P
CT HOSPITALIST  History and Physical     [facility] Prit[facility] Patient Status:  Observation    1940 MRN [id number]   [facility] 4NW-A Attending Woodrow Sullivan MD   Hosp Day # 0 PCP Tanika Massey MD     Chief Complaint: nausea vomiting geronimo pack-year smoking history. she has never used smokeless tobacco. She reports that she does not drink alcohol or use drugs.     Family History:   Family History   Problem Relation Age of Onset   • Stroke Mother    • Heart Disease Maternal Grandmother    • Ca Rfl: 0   Brimonidine Tartrate-Timolol (COMBIGAN) 0.2-0.5 % Ophthalmic Solution Place 1 drop into both eyes every 12 (twelve) hours. Disp:  Rfl:    Repaglinide 0.5 MG Oral Tab Take 0.5 mg by mouth daily.    Disp:  Rfl:    Acidophilus/Pectin Oral Cap Take 1 GLU  102*  148*   BUN  21*  18   CREATSERUM  0.63  0.65   GFRAA  99  98   GFRNAA  86  85   CA  7.8*  7.2*   ALB  3.5  3.4   NA  139  138   K  4.3  4.5   CL  111*  111*   CO2  21.0  17.0*   ALKPHO  59  44*   AST  15  13*   ALT  17  17   BILT  0.5  0.6   T

## 2019-03-28 NOTE — PLAN OF CARE
Assumed care @ 0730. Patient reported 3 loose stools this morning. Patient c/o abdominal cramping when having bowel movement. Patient denies nausea. Patient's vital signs stable. 1830- No further loose stools noted.

## 2019-03-28 NOTE — PROGRESS NOTES
CT HOSPITALIST  Progress Note     Khushboodianna Patricker Patient Status:  Observation    1940 MRN [id number]   [facility] 4NW-A Attending Elieser Tony MD   Hosp Day # 0 PCP lElie Sparks MD     Chief Complaint: abd pain diarrhea    S: Hollie results for input(s): PTP, INR in the last 168 hours. No results for input(s): TROP, CK in the last 168 hours. Imaging: Imaging data reviewed in Epic.     Medications:   • diphenoxylate-atropine  1 tablet Oral QID   • piperacillin-tazobactam  3.3

## 2019-03-29 NOTE — PROGRESS NOTES
CT HOSPITALIST  Progress Note     Karon Soriano Patient Status:  Observation    1940 MRN [id number]   [facility] 4NW-A Attending Yoel Mckeon MD   Hosp Day # 0 PCP Pauly Arvizu MD     Chief Complaint: abd pain diarrhea    S: Hollie 6. 2*  6.2*   --        Estimated Creatinine Clearance: 65 mL/min (based on SCr of 0.59 mg/dL). No results for input(s): PTP, INR in the last 168 hours. No results for input(s): TROP, CK in the last 168 hours.          Imaging: Imaging data reviewed in

## 2019-03-29 NOTE — PLAN OF CARE
GASTROINTESTINAL - ADULT    • Minimal or absence of nausea and vomiting Progressing    • Maintains or returns to baseline bowel function Progressing    • Maintains adequate nutritional intake (undernourished) Progressing        SAFETY ADULT - FALL    • Kennedy

## 2019-03-29 NOTE — ANESTHESIA PREPROCEDURE EVALUATION
PRE-OP EVALUATION    Patient Name: Dolores Corley    Pre-op Diagnosis: DIARRHEA    Procedure(s): FLEXIBLE SIGMOIDOSCOPY    Surgeon(s) and Role:     * Antonino Oliver MD - Primary    Pre-op vitals reviewed.   Temp: 98 °F (36.7 °C)  Pulse: 71  Resp: 16 mg 4 mg Intravenous Q6H PRN   Metoclopramide HCl (REGLAN) injection 10 mg 10 mg Intravenous Q8H PRN   brimonidine Tartrate (ALPHAGAN) 0.2 % ophthalmic solution 1 drop 1 drop Both Eyes TID   And      Timolol Maleate (TIMOPTIC) 0.5 % ophthalmic solution 1 dr Evaluation        Anesthetic Complications           GI/Hepatic/Renal             (+) chronic renal disease and ESRD                   Cardiovascular  Comment: Conclusions:    1. Left ventricle:  The cavity size was normal. Wall thickness was normal.     Sy date: 2007        Years since quittin.2      Smokeless tobacco: Never Used    Alcohol use: No      Frequency: Never      Drug use: No     Available pre-op labs reviewed.   Lab Results   Component Value Date    WBC 5.3 2019    RBC 4.17

## 2019-03-29 NOTE — OPERATIVE REPORT
Colon operative report  Patient Name: Bryan Navarro  Procedure: Colonoscopy   Indication: diarrhea  Attending: Eleonora Garvin M.D. Consent: The risks, benefits, and alternatives were discussed with the patient / POA.   Risks included, but were not limite changes. Random biopsies were obtained with a cold forceps. 3. Diverticulosis of the sigmoid colon noted. 4. 2nd degree internal hemorrhoids. Impression: Findings as above. Recommendations:   1. Continue anti-diarrheals   2.  OK to stop antibiotics

## 2019-03-29 NOTE — ANESTHESIA POSTPROCEDURE EVALUATION
[facility] 58 Patient Status:  Observation   Age/Gender 67year old female MRN [id number]   Location [facility]. Attending Jennifer Parker MD   Hosp Day # 0 PCP Vipul Pozo MD       Anesthesia Post-op Note    Procedure(s):

## 2019-03-30 NOTE — PROGRESS NOTES
CT HOSPITALIST  Progress Note     Roxane Quiroz Patient Status:  Observation    1940 MRN [id number]   [facility] 4NW-A Attending Molly Lomas MD   Hosp Day # 0 PCP Hernando Wang MD     Chief Complaint: abd pain and diarrhea    S: mg/dL). No results for input(s): PTP, INR in the last 168 hours. No results for input(s): TROP, CK in the last 168 hours. Imaging: Imaging data reviewed in Epic.     Medications:   • diphenoxylate-atropine  1 tablet Oral QID   • Acidophilus/Pe

## 2019-03-30 NOTE — PLAN OF CARE
Glucose maintained within prescribed range Progressing      Maintains or returns to baseline bowel function Progressing      Maintains adequate nutritional intake (undernourished) Progressing      Verbalizes/displays adequate comfort level or patient's sta

## 2019-03-30 NOTE — PLAN OF CARE
Assumed care @ 0730. Patient had 2 loose stools this shift. Patient denies nausea nor abdominal pain. Patient's vital signs stable. Lomotil given as scheduled.

## 2019-03-31 NOTE — PLAN OF CARE
Pt is alert and oriented x 4. Vitals stable. NO C/o pain or discomfort. Per pt diarrhea is improving. Tolerating diet. No nausea or vomiting. Ok to discharge pt per  while awaiting labs. Will continue to monitor.

## 2019-03-31 NOTE — PLAN OF CARE
Received patient in handoff. She offers no complaints at this time. States her bowel movements firming up some . Did not have a stool from 1900 to 2300. Endorsed care  at 9918 to Lorin BLOUNT

## 2019-03-31 NOTE — DISCHARGE SUMMARY
[facility] [place] HOSPITALIST  DISCHARGE SUMMARY     Andreas Bowman Patient Status:  Observation    1940 MRN [id number]   [facility] 4NW-A Attending Celine Olea MD   Hosp Day # 0 PCP Diane Umaña MD     Date of Admission: 3/27/2019  Date of Dis every 12 (twelve) hours. Refills:  0     hydrALAZINE HCl 100 MG Tabs  Commonly known as:  APRESOLINE      Take 100 mg by mouth 2 (two) times daily.    Refills:  0     latanoprost 0.005 % Soln  Commonly known as:  XALATAN      Place 1 drop into both eyes n Visit Type Length Department Provider     4/8/2019 10:30 AM  MEDICARE ANNUAL WELL VISIT [1607] 30 min.  Dedrick Early MD    Patient Instructions:      Please bring all medications, or a current list of medicati

## 2019-04-01 NOTE — TELEPHONE ENCOUNTER
Sonya from [facility] [address] called requesting to speak to the nurse to discuss patient's diabetic testing supplies.  Needs CMN form to be approved by Medicare

## 2019-04-01 NOTE — TELEPHONE ENCOUNTER
Patient discharged from [facility] on 3/31/19 and is at Moderate risk for readmission and recommended to have a TCM HFU by 4/14/19 or sooner. Patient reports she has an appointment on 4/8/19. [facility] confirmed patient has a Medicare wellness visit.      Magdaleno Hinton

## 2019-04-01 NOTE — PROGRESS NOTES
Initial Post Discharge Follow Up   Discharge Date: 3/31/19  Contact Date: 4/1/2019    Consent Verification:  Assessment Completed With: Patient  HIPAA Verified?   Yes    Discharge Dx:    Metabolic acidosis  HX: DM, CAD/Stent, Renal transplant    General: mouth 2 (two) times daily. Disp:  Rfl:    hydrALAZINE HCl 100 MG Oral Tab Take 100 mg by mouth 2 (two) times daily. Disp:  Rfl:    Methenamine Hippurate 1 g Oral Tab Take 1 g by mouth 2 (two) times daily.  Disp:  Rfl:    spironolactone 25 MG Oral Tab Take 1 Visit with Yessica Ley MD [address] ([facility])    Please bring all medications, or a current list of medications to the visit as well as a list of current specialists (care team).     Apr 09, 20 Discharge medications reviewed/discussed/and reconciled against outpatient medications with patient,  and orders reviewed and discussed. Any changes or updates to medications and or orders sent to PCP.      For patients with TCC appointments:     []  Advise

## 2019-04-03 RX ORDER — SPIRONOLACTONE 25 MG/1
TABLET ORAL
COMMUNITY
Start: 2018-12-20

## 2019-04-03 RX ORDER — SIMVASTATIN 20 MG
TABLET ORAL
COMMUNITY
Start: 2018-12-20

## 2019-04-03 RX ORDER — SIMVASTATIN 10 MG
TABLET ORAL
COMMUNITY
Start: 2018-12-20

## 2019-04-03 RX ORDER — HYDRALAZINE HYDROCHLORIDE 100 MG/1
TABLET, FILM COATED ORAL
COMMUNITY
Start: 2018-12-20

## 2019-04-03 RX ORDER — REPAGLINIDE 0.5 MG/1
TABLET ORAL
COMMUNITY
Start: 2018-12-20

## 2019-04-03 RX ORDER — METHENAMINE HIPPURATE 1000 MG/1
TABLET ORAL
COMMUNITY
Start: 2018-12-20

## 2019-04-03 RX ORDER — MYCOPHENOLIC ACID 360 MG/1
TABLET, DELAYED RELEASE ORAL
COMMUNITY

## 2019-04-03 RX ORDER — LOSARTAN POTASSIUM 50 MG/1
TABLET ORAL
COMMUNITY
Start: 2018-12-20

## 2019-04-08 PROBLEM — R79.89 AZOTEMIA: Status: RESOLVED | Noted: 2019-01-01 | Resolved: 2019-01-01

## 2019-04-08 PROBLEM — E87.20 METABOLIC ACIDOSIS: Status: RESOLVED | Noted: 2019-01-01 | Resolved: 2019-01-01

## 2019-04-08 PROBLEM — Z94.0 KIDNEY TRANSPLANT RECIPIENT: Status: RESOLVED | Noted: 2018-06-05 | Resolved: 2019-01-01

## 2019-04-08 PROBLEM — R73.9 HYPERGLYCEMIA: Status: RESOLVED | Noted: 2019-01-01 | Resolved: 2019-01-01

## 2019-04-08 PROBLEM — E87.2 METABOLIC ACIDOSIS: Status: RESOLVED | Noted: 2019-01-01 | Resolved: 2019-01-01

## 2019-04-08 NOTE — PROGRESS NOTES
HPI:    Karon Soriano is a 67year old female here today for hospital follow up.    She was discharged from Inpatient hospital, [facility] to Home   Admission Date: 3/27/19   Discharge Date: 3/31/19  Hospital Discharge Diagnoses (since 3/9/2019)   No negative. She was subsequently DC home    She is here now for f/u.     No further episodes of diarrhea    Watching diet carefully     No abd cramping   No nv      Is on simvastatin 10 now        Allergies:  She is allergic to vasotec [enalapril]; amlodipin problem, Cancer ([facility] Cataract, Coronary atherosclerosis, Diabetes ([facility] Disorder of liver, Facial basal cell cancer, Glaucoma, High blood pressure, High cholesterol, Osteoarthritis, Osteoporosis, Other and unspecified hyperlipidemia, Polycystic kidney d and all orders for this visit:    Diarrhea, unspecified type    Essential hypertension    Hypercholesterolemia        No orders of the defined types were placed in this encounter.       Meds & Refills for this Visit:  Requested Prescriptions      No prescri

## 2019-05-10 NOTE — ED PROVIDER NOTES
Patient Seen in: [facility] Emergency Department    History   Patient presents with:  Nausea/Vomiting/Diarrhea (gastrointestinal)    Stated Complaint: diarrhea x 5 days- recent negative c-diff    HPI    76-year-old female who presents to the [address] [facility] 1.00        Years: 30.00        Pack years: 27        Quit date: 2007        Years since quittin.3      Smokeless tobacco: Never Used    Alcohol use: No      Frequency: Never    Drug use: No      Review of Systems    Positive for stated complaint: Sodium 134 (*)     CO2 20.0 (*)     BUN 24 (*)     BUN/CREA Ratio 29.3 (*)     AST 14 (*)     Alkaline Phosphatase 54 (*)     All other components within normal limits   URINALYSIS WITH CULTURE REFLEX - Abnormal; Notable for the following components: [address]  [phone]    Schedule an appointment as soon as possible for a visit in 2 days      Andrew Ramos MD  [address] Feli Pal[place]  [phone]    Schedule an appointment as soon as possible for a visit in 1 week          Medication

## 2019-05-10 NOTE — ED INITIAL ASSESSMENT (HPI)
Patient reports issues with diarrhea for past month at least. Reports negative stool cultures done. Admits to feeling better for a few days and worse again last night, watery in nature. Denies vomiting. Admits to fatigue, weight loss.  [facility]

## 2019-05-10 NOTE — ED NOTES
Patient remains updated with results and plan for discharge. No distress observed. Alert and appropriate.

## 2019-05-15 PROBLEM — N30.00 ACUTE CYSTITIS WITHOUT HEMATURIA: Status: RESOLVED | Noted: 2019-01-01 | Resolved: 2019-01-01

## 2019-05-15 PROBLEM — R19.7 DIARRHEA, UNSPECIFIED TYPE: Status: RESOLVED | Noted: 2019-01-01 | Resolved: 2019-01-01

## 2019-05-15 PROBLEM — R19.7 DIARRHEA OF PRESUMED INFECTIOUS ORIGIN: Status: ACTIVE | Noted: 2019-01-01

## 2019-05-15 PROBLEM — Z00.00 MEDICARE ANNUAL WELLNESS VISIT, SUBSEQUENT: Status: ACTIVE | Noted: 2019-01-01

## 2019-05-15 PROBLEM — I71.4 ABDOMINAL AORTIC ANEURYSM (AAA) WITHOUT RUPTURE (HCC): Status: ACTIVE | Noted: 2019-01-01

## 2019-05-15 PROBLEM — I71.40 ABDOMINAL AORTIC ANEURYSM (AAA) WITHOUT RUPTURE (HCC): Status: ACTIVE | Noted: 2019-01-01

## 2019-05-15 NOTE — PROGRESS NOTES
HPI:   Jack Conrad is a 67year old female who presents for a Medicare Subsequent Annual Wellness visit (Pt already had Initial Annual Wellness).       Her last annual assessment has been over 1 year: Annual Physical due on 03/06/2019         Fall/Risk CAGE Alcohol screening   Jack Conrad was screened for Alcohol abuse and had a score of 0 so is at low risk.     Patient Care Team: Patient Care Team:  Tres Doyle MD as PCP - General (Family Practice)  Tres Doyle MD as PCP - [facility]  Liliana Hernandez Take 1 mg by mouth every evening. mycophenolate sodium 360 MG Oral Tab EC Take 360 mg by mouth 2 (two) times daily before meals. Losartan Potassium 50 MG Oral Tab Take 50 mg by mouth daily.    metoprolol Tartrate 25 MG Oral Tab Take 25 mg by mouth 2 (tw that she does not drink alcohol or use drugs.      REVIEW OF SYSTEMS:   GENERAL: feels well otherwise  SKIN: denies rash  EYES: denies blurred vision   HEENT: denies nasal congestion, sinus pain or ST  LUNGS: denies shortness of breath with exertion  CARDIO • Fluvirin, 3 Years & >, Im 08/28/2013, 10/01/2015, 09/30/2016   • Fluzone Vaccine Medicare () 10/13/2014, 09/01/2015, 08/29/2017, 09/12/2018   • Influenza 09/01/2018   • Pneumococcal (Prevnar 13) 08/29/2017, 09/01/2018   • Pneumovax 23 10/13/2014, Hyperparathyroidism due to renal insufficiency (HCC)  Plan: follow    (N18.6) End-stage renal disease ([facility] [address] [facility]  Plan: s//p transplant       Diarrhea:   Reviewed stool studies   No odor  Veronica C Diff   To see Dr Bette Cabral        The patient indicates understandi patient. Update Health Maintenance if applicable    Chlamydia  Annually if high risk No results found for: CHLAMYDIA No flowsheet data found.     Screening Mammogram      Mammogram Annually to 77, then as discussed There are no preventive care reminders to

## 2019-05-15 NOTE — PATIENT INSTRUCTIONS
Marsha Glynn's SCREENING SCHEDULE   Tests on this list are recommended by your physician but may not be covered, or covered at this frequency, by your insurer. Please check with your insurance carrier before scheduling to verify coverage.    PREVENTATI years- more often if abnormal There are no preventive care reminders to display for this patient. Update Health Maintenance if applicable    Flex Sigmoidoscopy Screen  Covered every 5 years No results found for this or any previous visit.  No flowsheet data 66 No orders found for this or any previous visit. Please get once after your 65th birthday    Pneumococcal 23 (Pneumovax)  Covered Once after 65 No orders found for this or any previous visit.  Please get once after your 65th birthday    Hepatitis B for Mo

## 2019-05-20 NOTE — TELEPHONE ENCOUNTER
Papito Doll from [facility] called requesting to speak with the nurse regarding lab test cancellation for C diff.    Ph# [phone]

## 2019-05-28 NOTE — TELEPHONE ENCOUNTER
Pt called to go over stool results advised test canceled due to formed stool received . Pt states that was only form stool she has had diarrhea since.  Cdiff reordered

## 2019-05-30 PROBLEM — R53.1 WEAKNESS GENERALIZED: Status: ACTIVE | Noted: 2019-01-01

## 2019-05-30 PROBLEM — R19.7 DIARRHEA, UNSPECIFIED TYPE: Status: ACTIVE | Noted: 2019-01-01

## 2019-05-30 PROBLEM — E86.0 DEHYDRATION: Status: ACTIVE | Noted: 2019-01-01

## 2019-05-30 PROBLEM — R11.2 NAUSEA AND VOMITING IN ADULT: Status: ACTIVE | Noted: 2019-01-01

## 2019-05-30 NOTE — ED PROVIDER NOTES
Patient Seen in: [facility] Emergency Department    History   Patient presents with:  Fatigue (constitutional, neurologic)  Nausea/Vomiting/Diarrhea (gastrointestinal)    Stated Complaint: weakness, diarrhea, hx of kidney transplant    HPI    79-yea Eric Millan   • FLEXIBLE SIGMOIDOSCOPY N/A 3/29/2019    Performed by Kristen Jha MD at [facility] ENDOSCOPY   • NEEDLE BIOPSY LEFT      1960's   • OTHER      partial liver removal   • OTHER SURGICAL HISTORY     • TRANSPLANTATION OF KIDNEY  3/7/2009    ri Total 8.4 (*)     Calculated Osmolality 300 (*)     GFR, Non- 48 (*)     GFR, -American 55 (*)     AST 8 (*)     All other components within normal limits   CBC W/ DIFFERENTIAL - Abnormal; Notable for the following components:    RDW Hemorrhage due to vascular prosthetic devices, implants and grafts, initial encounter I72.9 Aneurysm of unspecified site  TECHNIQUE:  Real time, grey scale, and duplex ultrasound was used to evaluate the upper extremity venous system.  B-mode two-dimensiona extremity arterial system. B-mode two dimensional images of the vascular structures, Doppler spectral analysis, and color flow Doppler imaging were performed.   PATIENT STATED HISTORY: (As transcribed by Technologist)  PATIENT STATES SHE HAS A LUE AV-FISTUL admitted to the hospital for further hydration and control of her vomiting and work-up at this time.     Admission disposition: 5/30/2019  3:26 PM                 Disposition and Plan     Clinical Impression:  Dehydration  (primary encounter diagnosis)  Eliot

## 2019-05-30 NOTE — ED INITIAL ASSESSMENT (HPI)
Weakness, metabolic acidosis, kidney transplant patient. Pt has lost over 25lb in the past 2 months. Pt is always nauseated, cannot eat. C/o weakness, dizziness, fatigue.

## 2019-05-30 NOTE — H&P
CT HOSPITALIST  History and Physical     Ramon Shadow Patient Status:  Inpatient    1940 MRN [id number]   [facility] 5NW-A Attending Tedd Lesch, MD   Hosp Day # 0 PCP Morris Cisneros MD     Chief Complaint: diarrhea    History smokeless tobacco. She reports that she does not drink alcohol or use drugs.     Family History:   Family History   Problem Relation Age of Onset   • Stroke Mother    • Heart Disease Maternal Grandmother    • Cancer Maternal Grandfather    • Other (Other) F Take 2 mg by mouth every morning. Disp:  Rfl:    predniSONE (DELTASONE) 5 MG Oral Tab Take 5 mg by mouth daily. Disp:  Rfl:    denosumab (PROLIA) 60 MG/ML Subcutaneous Solution Inject 60 mg into the skin every 6 (six) months.  Disp:  Rfl:        Review of recent unremarkable colonoscopy  4. Possible side effect of transplant medications? 5. cdiff neg  4. PCKD s/p renal transplant  1. Cont meds  5. DM2  6. HTN  7.  HLD    Quality:  · DVT Prophylaxis: lovenox  · CODE status: full  · Koch: no    Plan of care

## 2019-05-30 NOTE — ED NOTES
Report given to Chelsey Bonilla, [facility]. Patient and family updated on plan of care, no questions at this time.

## 2019-05-30 NOTE — PROGRESS NOTES
CHIEF COMPLAINT:     Patient presents with:  Dehydration: Pt has lost 7 lbs since 5-15-19. Pt is concerened that she is loosing all this weight. Pt is still having Diarrhea and vomiting. Last time pt vomit was last night.  Pt cannot keep no food down, pt st Tartrate-Timolol (COMBIGAN) 0.2-0.5 % Ophthalmic Solution Place 1 drop into both eyes every 12 (twelve) hours. Disp:  Rfl:    Acidophilus/Pectin Oral Cap Take 1 capsule by mouth daily.  Disp:  Rfl:    tacrolimus (PROGRAF) 1 MG Oral Cap Take 2 mg by mouth SpO2 98%   BMI 20.06 kg/m²   GENERAL: 68 yo F with past medical history of kidney transplant, looks tired, and moves slow.    SKIN: dry skin  HEAD: atraumatic, normocephalic  EYES: conjunctiva clear, EOM intact, PERRLA, anicteric  EARS: TM's intact, no bul

## 2019-05-31 NOTE — PROGRESS NOTES
CT HOSPITALIST  Progress Note     Green Bigger Patient Status:  Inpatient    1940 MRN [id number]   [facility] 5NW-A Attending Christiane Soriano MD   Hosp Day # 1 PCP Johanne Powell MD     Chief Complaint: diarrhea    S: Patient still • metoprolol Tartrate  25 mg Oral 2x Daily(Beta Blocker)   • mycophenolate sodium  360 mg Oral BID AC   • predniSONE  5 mg Oral Daily   • Pravastatin Sodium  20 mg Oral Nightly   • tacrolimus  2 mg Oral QAM   • tacrolimus  1 mg Oral QPM   • enoxaparin  4

## 2019-05-31 NOTE — PLAN OF CARE
Problem: Patient/Family Goals  Goal: Patient/Family Long Term Goal  Description  Patient's Long Term Goal: resolve diarrhea and d/c home    Interventions:  - IVF   -stool sample and cx  - See additional Care Plan goals for specific interventions   Outcom

## 2019-05-31 NOTE — CONSULTS
[facility]                       Gastroenterology Consultation-[facility]    Mily Lockett Patient Status:  Inpatient    1940 MRN [id number]   [facility] 5NW-A Attending Jossy Oliver MD   [facility] Day [facility] 1 PCP Gaurav Res High blood pressure    • High cholesterol    • Osteoarthritis    • Osteoporosis    • Other and unspecified hyperlipidemia    • Polycystic kidney disease    • Rectal cancer ([facility] [address]    • Renal disorder     kidney transplant 3/7/2009   • Unspecified essential h acetaminophen (TYLENOL) tab 650 mg 650 mg Oral Q6H PRN   ondansetron HCl (ZOFRAN) injection 4 mg 4 mg Intravenous Q6H PRN   Metoclopramide HCl (REGLAN) injection 5 mg 5 mg Intravenous Q8H PRN   glucose (DEX4) oral liquid 15 g 15 g Oral Q15 Min PRN   Or Rheumatologic: + chronic arthritis            Genitourinary: + s/p renal transplant (2009; [facility])           Psychiatric: The patient reports no history of depression, anxiety, suicidal ideation, or homicidal ideation           Oncologic: +squamous cell carc CO2 17.0* 15.0*     Recent Labs   Lab 05/31/19  0728   RBC 3.82   HGB 10.9*   HCT 35.4   MCV 92.7   MCH 28.5   MCHC 30.8*   RDW 15.7*   NEPRELIM 3.68   WBC 5.0   .0       Recent Labs   Lab 05/30/19  1159 05/31/19  0728   ALT 21 16   AST 8* 7* gluten/lactose intolerance, plan for fecal elastase, celiac screen, fecal calprotectin, fat, advance diet to lactose free as tolerated, antidiarrheals around the clock titrate as needed and questran daily titrate as needed, consider EGD and small bowel bio

## 2019-05-31 NOTE — PLAN OF CARE
DX: DIARRHEA  PLAN OF CARE: IV FLUIDS, ADVANCE DIET AS TOLERATED, STOOL STUDIES, GI ON CONSULT  PT COMMUNICATES UNDERSTANDING, NO PAIN. FREQUENT STOOL THIS AM. STARTED ON IMODIUM & Ismael Dubin.    Problem: Patient/Family Goals  Goal: Patient/Family Long Term

## 2019-05-31 NOTE — CM/SW NOTE
05/31/19 1400   CM/SW Screening   Referral Source    Information Source Chart review;Nursing rounds   Patient's Mental Status Alert;Oriented   Patient lives with Children   Patient Status Prior to Admission   Independent with ADLs and Mobili

## 2019-05-31 NOTE — PROGRESS NOTES
05/31/19 1627   Clinical Encounter Type   Visited With Patient   Continue Visiting No   Sacramental Encounters   Sacrament of Sick-Anointing Anointed   Patient Spiritual Encounters   Spiritual Interventions [address] Lane Ramachandran provided support, scripture and SOS

## 2019-05-31 NOTE — PROGRESS NOTES
[facility] Pharmacy Note:  Renal Dose Adjustment for Metoclopramide (REGLAN)    Becky Garcia has been prescribed Metoclopramide (REGLAN) 10 mg every 8 hours as needed for nausea, vomiting.     Estimated Creatinine Clearance: 33.9 mL/min (A) (based on SCr of 1.1

## 2019-06-01 NOTE — PLAN OF CARE
DX: DIARRHEA  PLAN OF CARE: IV FLUIDS, ADVANCE DIET AS TOLERATED, STOOL STUDIES, GI ON CONSULT  PT COMMUNICATES UNDERSTANDING, NO PAIN. FREQUENT STOOL THIS AM. STARTED ON IMODIUM & Jose Flatness.  POSSIBLE DC HOME TODAY PENDING GI  Problem: Patient/Family Goals returns to baseline bowel function  Description  INTERVENTIONS:  - Assess bowel function  - Maintain adequate hydration with IV or PO as ordered and tolerated  - Evaluate effectiveness of GI medications  - Encourage mobilization and activity  - Obtain nutr

## 2019-06-01 NOTE — PLAN OF CARE
Problem: Patient/Family Goals  Goal: Patient/Family Long Term Goal  Description  Patient's Long Term Goal: resolve diarrhea and d/c home    Interventions:  - IVF   -stool sample and cx  - See additional Care Plan goals for specific interventions   Outcom and activity  - Obtain nutritional consult as needed  - Establish a toileting routine/schedule  - Consider collaborating with pharmacy to review patient's medication profile  Outcome: Progressing       See flowsheets. Received pt AOX4.  Pt denies pain, VSS,

## 2019-06-01 NOTE — CONSULTS
[address]    PATIENT'S NAME: Risa Vitale   ATTENDING PHYSICIAN: Larry Ireland MD   CONSULTING PHYSICIAN: Janell Merchatn M.D.    PATIENT ACCOUNT#:   [de-identified]    LOCATION:  [address] [place] [address]  MEDICAL RECORD #:   [id number]       DATE OF BIRTH:  0

## 2019-06-02 NOTE — OPERATIVE REPORT
Cristian Bedolla Patient Status:  Inpatient    1940 MRN [id number]   [facility] ENDOSCOPY Attending Lorenza Wild MD   Date 2019 PCP Josephine Hernandez MD     PREOPERATIVE DIAGNOSIS/INDICATION: Diarrhea chronic, chronic immune suppress

## 2019-06-02 NOTE — ANESTHESIA POSTPROCEDURE EVALUATION
[facility] Patient Status:  Inpatient   Age/Gender 67year old female MRN [id number]   Location [address] [place] [address]. Attending Patricio Hughes MD   Hosp Day # 3 PCP Vipul Pozo MD       Anesthesia Post-op Note    Procedure(s):

## 2019-06-02 NOTE — PROGRESS NOTES
[facility]  Progress Note    Chuyita Miller Patient Status:  Inpatient    1940 MRN [id number]   [facility] 5NW-A Attending Harvinder Hart MD   Date 2019 PCP Tanika Massey MD     Subjective:  Chuyita Miller is a(n) 67 year o the patient including but not limited to bleeding early or delayed, infection, perforation early or delayed, adverse drug reactions and the potential need for surgery, blood transfusion, repeat endoscopy, revision, antibiotics and/or prolonged hospitalizat

## 2019-06-02 NOTE — PROGRESS NOTES
CT HOSPITALIST  Progress Note     Roxane Quiroz Patient Status:  Inpatient    1940 MRN [id number]   [facility] 5NW-A Attending Ajay Bryan MD   Hosp Day # 2 PCP Hernando Wang MD     Chief Complaint: diarrhea    S: Patient still Methenamine Hippurate  1 g Oral BID   • metoprolol Tartrate  25 mg Oral 2x Daily(Beta Blocker)   • mycophenolate sodium  360 mg Oral BID AC   • predniSONE  5 mg Oral Daily   • Pravastatin Sodium  20 mg Oral Nightly   • tacrolimus  2 mg Oral QAM   • tacroli

## 2019-06-02 NOTE — ANESTHESIA PREPROCEDURE EVALUATION
PRE-OP EVALUATION    Patient Name: Jadon Cooper    Pre-op Diagnosis: Chronic diarrhea [K52.9]    Procedure(s):  ESOPHAGOGASTRODUODENOSCOPY (EGD)    Surgeon(s) and Role:     Yassine Frye MD - Primary    Pre-op vitals reviewed. Temp: 97.7 °F (36. glucose (DEX4) oral liquid 30 g 30 g Oral Q15 Min PRN   Or      Glucose-Vitamin C (DEX-4) 4-6 GM-MG chewable tab 8 tablet 8 tablet Oral Q15 Min PRN   Insulin Aspart Pen (NOVOLOG) 100 UNIT/ML flexpen 2-10 Units 2-10 Units Subcutaneous TID CC and HS   [facility] GI/Hepatic/Renal  Comment: Kidney transplant  Diarrhea now for EGD with biopsies.           (+) chronic renal disease                    Cardiovascular      ECG reviewed.             (+) hypertension   (+) hyperlipidemia  (+) CAD Airway      Mallampati: I  Mouth opening: >3 FB  TM distance: > 6 cm  Neck ROM: full Cardiovascular    Cardiovascular exam normal.  Rhythm: regular  Rate: normal     Dental      Dental appliance(s): upper dentures and lower dentures       Pulmonary

## 2019-06-02 NOTE — PLAN OF CARE
Problem: Patient/Family Goals  Goal: Patient/Family Long Term Goal  Description  Patient's Long Term Goal: resolve diarrhea and d/c home    Interventions:  - IVF   -stool sample and cx  - See additional Care Plan goals for specific interventions   Outcom IN AM, IVF INFUSING, NO C/O PAIN    ACTION:MEDICATIONS GIVEN AS ORDERED, CONTINUE TO MONITOR, MAINTAIN NPO    TEACHING:MEDICATIONS AND POC    RESPONSE: PT COMMUNICATES UNDERSTANDING

## 2019-06-02 NOTE — PROGRESS NOTES
NURSING DISCHARGE NOTE    Discharged Home via Wheelchair. Accompanied by Support staff  Belongings Taken by patient/family. DC HOME WITH FAMILY, IV REMOVED.  OK FOR DC PER HOSPITALIST AND GI. HOME  [address] INSTRUCTIONS/MED

## 2019-06-03 NOTE — PROGRESS NOTES
Initial Post Discharge Follow Up   Discharge Date: 6/2/19  Contact Date: 6/3/2019    Consent Verification:  Assessment Completed With: Patient  HIPAA Verified?   Yes    Discharge Dx:   diarrhea 2/2 myfortiq side effect, dehydration, RADHA, PCKD s/p renal tr Oral Cap Take 1 mg by mouth every evening. Disp:  Rfl:    mycophenolate sodium 360 MG Oral Tab EC Take 360 mg by mouth 2 (two) times daily before meals. Disp:  Rfl:    metoprolol Tartrate 25 MG Oral Tab Take 25 mg by mouth 2 (two) times daily.  Disp:  Rfl: disease concerns, Etc): No     Follow up appointments:       Your appointments     Date & Time Appointment Department [facility])    Jun 13, 2019 10:20 AM CDT Follow-Up OV with Katheryn Oliver MD [facility] ([facility])    Please it will be switched to another one. She has no new or worsening conditions at this time. Med review completed. She had no further questions or concerns. NC scheduled HFU for 6/10/2019.          [x]  Discharge Summary, Discharge medications reviewed/discuss

## 2019-06-03 NOTE — DISCHARGE SUMMARY
CT HOSPITALIST  DISCHARGE SUMMARY     Winsome Abad Patient Status:  Inpatient    1940 MRN [id number]   [facility] 5NW-A Attending No att. providers found   Hosp Day # 3 PCP Kacy Banerjee MD     Date of Admission: 2019  Date sob, le edema. Brief Synopsis: underwent EGD w/ biopsies taken. Only showed some esopahgitis and gastritis. Suspect diarrhea 2/2 side effect of transplant meds. D/w Edgardo Du with the transplant team at [facility].  Diarrhea can be common side effect of Myfort Refills:  0     latanoprost 0.005 % Soln  Commonly known as:  XALATAN      Place 1 drop into both eyes nightly. Refills:  0     losartan Potassium 50 MG Tabs  Commonly known as:  COZAAR      Take 50 mg by mouth daily.    Refills:  0     Methenamine Hippur at 10:20 am to discuss symptoms     MD Vicente Collier    Schedule an appointment as soon as possible for a visit        Vital signs:  Temp:  [97.7 °F (36.5 °C)-97.9 °F (36.6 °C)] 97.9 °F (36.6 °C)

## 2019-06-10 NOTE — PROGRESS NOTES
HPI:    Tj Nugent is a 67year old female here today for hospital follow up.    She was discharged from Inpatient hospital, [facility] to Home   Admission Date: 5/30/19   Discharge Date: 6/2/19  Hospital Discharge Diagnoses (since 5/11/2019) Unfortunately pt already on reduced dose. They requested check CMV and trial of imodium. They will f/u w/ pt soon and discuss alternative treatment options. CMV pending although had a recent colonoscopy w/ biopsies and no evidence of CMV at that time.  Pt w Inject 60 mg into the skin every 6 (six) months. Brimonidine Tartrate-Timolol (COMBIGAN) 0.2-0.5 % Ophthalmic Solution Place 1 drop into both eyes every 12 (twelve) hours. tacrolimus (PROGRAF) 1 MG Oral Cap Take 2 mg by mouth every morning.      predn PSYCHE: denies depression       PHYSICAL EXAM:   No LMP recorded. (Menstrual status: Menopause). Estimated body mass index is 20.73 kg/m² as calculated from the following:    Height as of this encounter: 63\". Weight as of this encounter: 117 lb.    B ID: Susan Billy is a 67year old female. HPI    Review of Systems           Current Outpatient Medications:  Loperamide HCl 2 MG Oral Cap Take 1 capsule (2 mg total) by mouth TID CC and HS.  Disp: 90 capsule Rfl: 0   Pantoprazole Sodium 40 MG Oral Ta COMMENTS)    Comment:Swollen feet             numbness  Atorvastatin            OTHER (SEE COMMENTS)    Comment:Very weak and pain in legs, syncope  Clopidogrel             OTHER (SEE COMMENTS)    Comment:bleeding   PHYSICAL EXAM:   Physical Exam

## 2019-06-14 NOTE — TELEPHONE ENCOUNTER
Pt daughter Fredi called stating labs for Dr. Dena Flores not in system. Labs for Dr. Dena Flores are in system. After pt daughter review she stated labs from [facility] not in system but they were supposed to fax orders to our office.  No TE/encounter from our office stat

## 2019-06-24 ENCOUNTER — TELEPHONE (OUTPATIENT)
Dept: CARDIOLOGY | Age: 79
End: 2019-06-24

## 2019-06-27 PROBLEM — N18.30 ANEMIA OF CHRONIC RENAL FAILURE, STAGE 3 (MODERATE): Status: ACTIVE | Noted: 2019-01-01

## 2019-06-27 PROBLEM — Z00.00 MEDICARE ANNUAL WELLNESS VISIT, SUBSEQUENT: Status: RESOLVED | Noted: 2019-01-01 | Resolved: 2019-01-01

## 2019-06-27 PROBLEM — E43 SEVERE PROTEIN-CALORIE MALNUTRITION (HCC): Status: ACTIVE | Noted: 2019-01-01

## 2019-06-27 PROBLEM — D63.1 ANEMIA OF CHRONIC RENAL FAILURE, STAGE 3 (MODERATE) (HCC): Status: ACTIVE | Noted: 2019-01-01

## 2019-06-27 PROBLEM — N18.30 ANEMIA OF CHRONIC RENAL FAILURE, STAGE 3 (MODERATE) (HCC): Status: ACTIVE | Noted: 2019-01-01

## 2019-06-27 PROBLEM — D63.1 ANEMIA OF CHRONIC RENAL FAILURE, STAGE 3 (MODERATE): Status: ACTIVE | Noted: 2019-01-01

## 2019-06-27 NOTE — PROGRESS NOTES
HPI:    Patient ID: Jadon Cooper is a 67year old female. HPI  Jadon Cooper is a 67year old female.   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 Disp:  Rfl:    denosumab (PROLIA) 60 MG/ML Subcutaneous Solution Inject 60 mg into the skin every 6 (six) months.  Disp:  Rfl:    Brimonidine Tartrate-Timolol (COMBIGAN) 0.2-0.5 % Ophthalmic Solution Place 1 drop into both eyes every Frequency: Never    Drug use: No       REVIEW OF SYSTEMS:   GENERAL HEALTH: feels well otherwise  SKIN: denies rashes  RESPIRATORY: denies shortness of breath   CARDIOVASCULAR: denies chest pain on exertion  GI: denies abdominal pain  NEURO: denies headach TWICE DAILY Disp: 180 tablet Rfl: 0   HYDRALAZINE  MG Oral Tab TAKE 1 TABLET BY MOUTH TWICE DAILY Disp: 180 tablet Rfl: 0   Loperamide HCl 2 MG Oral Cap Take 1 capsule (2 mg total) by mouth TID CC and HS.  Disp: 90 capsule Rfl: 0   Pantoprazole Sodiu encounter.       Meds This Visit:  Requested Prescriptions      No prescriptions requested or ordered in this encounter       Imaging & Referrals:  None       IQ#[id number]

## 2019-07-15 PROBLEM — Z94.0 KIDNEY TRANSPLANTED: Status: ACTIVE | Noted: 2019-01-01

## 2019-07-15 PROBLEM — D64.9 ANEMIA: Status: ACTIVE | Noted: 2019-01-01

## 2019-07-15 PROBLEM — I71.4 AAA (ABDOMINAL AORTIC ANEURYSM) WITHOUT RUPTURE (HCC): Status: ACTIVE | Noted: 2019-01-01

## 2019-07-15 PROBLEM — I71.40 AAA (ABDOMINAL AORTIC ANEURYSM) WITHOUT RUPTURE (HCC): Status: ACTIVE | Noted: 2019-01-01

## 2019-07-15 PROBLEM — K52.9 CHRONIC DIARRHEA: Status: ACTIVE | Noted: 2019-01-01

## 2019-07-15 PROBLEM — E87.70 HYPERVOLEMIA, UNSPECIFIED HYPERVOLEMIA TYPE: Status: ACTIVE | Noted: 2019-01-01

## 2019-07-15 PROBLEM — E87.1 HYPONATREMIA: Status: ACTIVE | Noted: 2019-01-01

## 2019-07-15 PROBLEM — R79.89 AZOTEMIA: Status: ACTIVE | Noted: 2019-01-01

## 2019-07-15 PROBLEM — R73.9 HYPERGLYCEMIA: Status: ACTIVE | Noted: 2019-01-01

## 2019-07-15 NOTE — TELEPHONE ENCOUNTER
Patient's daughter called stating that patient has been having extreme weakness, difficulty walking and hand tremors.  Requesting to see Dr. Marya Joshi as soon as possible  (only 15 min appointments available for today)    Please advise

## 2019-07-15 NOTE — ED INITIAL ASSESSMENT (HPI)
PT c/o lethargy and lack of appetite and diarrhea, in addition, Pt c/o left sided abdominal pain, which PT rpt she believes is musculoskeletal because pain exacerbated by movement

## 2019-07-15 NOTE — ED PROVIDER NOTES
Patient Seen in: [facility] Emergency Department    History   Patient presents with:  Fatigue (constitutional, neurologic)  Numbness Weakness (neurologic)    Stated Complaint: progressive generalized weakness and tremors for several weeks    HPI hand shaking that lasts for less than 1 hour, and currently has resolved. She denies chest pain, cough, fever, nausea and vomiting.     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 Neck: Normal range of motion. Cardiovascular: Normal rate, regular rhythm, normal heart sounds and intact distal pulses. Pulmonary/Chest: Effort normal and breath sounds normal. No respiratory distress. She has no wheezes.  She has n PLATELET    Narrative: The following orders were created for panel order CBC WITH DIFFERENTIAL WITH PLATELET.   Procedure                               Abnormality         Status                     ---------                               ----------- at 13:54     Approved by: Kimberley Moyer MD              This case is discussed with Dr. Leah Deng who evaluates patient agrees with the plan of care. This case is discussed with Dr. Nicolette Trent hospitalist who accepts admission.       MDM   Patient

## 2019-07-15 NOTE — H&P
CT HOSPITALIST  History and Physical     Murtis Deal Patient Status:  Inpatient    1940 MRN [id number]   [facility] 4NW-A Attending Wagner Jones MD   Hosp Day # 0 PCP Priscilla Garza MD     Chief Complaint: abd pain    Histor murmur    • History of rheumatic fever    • Indigestion    • Irregular bowel habits    • Kidney failure    • Nausea    • Night sweats    • Osteoarthritis    • Osteoporosis    • Other and unspecified hyperlipidemia    • Polycystic kidney disease    • Rectal SWELLING  Amlodipine              OTHER (SEE COMMENTS)    Comment:Swollen feet             numbness  Atorvastatin            OTHER (SEE COMMENTS)    Comment:Very weak and pain in legs, syncope  Clopidogrel             OTHER (SEE COMMENTS)    Comment:bleedi review of systems was completed. Pertinent positives and negatives noted in the HPI.     Physical Exam:    /82 (BP Location: Left arm)   Pulse 62   Temp 98.6 °F (37 °C) (Temporal)   Resp 18   Ht 5' 3\" (1.6 m)   Wt 113 lb (51.3 kg)   SpO2 97%   BMI symptoms though. Reviewed previous abd CTA which shows endoleak within aneurysm sac with possible type II endoleak. She saw vascular in April but has not been back yet. She is now anemic.   Will have vascular eval.       Quality:  · DVT Prophylaxis: SCD

## 2019-07-16 PROCEDURE — 99222 1ST HOSP IP/OBS MODERATE 55: CPT | Performed by: INTERNAL MEDICINE

## 2019-07-16 NOTE — PLAN OF CARE
Received pt at 2000. Generalized weakness. Up with x1 assist.   Denies pain/discomfort. LLE slightly more edematous than R.   Urine collected for labs this AM.           Problem: SAFETY ADULT - FALL  Goal: Free from fall injury  Description  INTERVENTIO wounds/incisions during mobilization  - Obtain PT/OT consults as needed  - Advance activity as appropriate  - Communicate ordered activity level and limitations with patient/family  Outcome: Progressing

## 2019-07-16 NOTE — CONSULTS
[facility]  Report of Consultation    Stevanelle Amber Patient Status:  Inpatient    1940 MRN [id number]   [facility] 7NE-A Attending Trung Ramirez MD   Hosp Day # 1 PCP Maryann Lynn MD     Reason for Consultation:  Abdominal • Flatulence/gas pain/belching    • Frequent UTI    • Glaucoma     left   • Heartburn    • High blood pressure    • High cholesterol    • History of cardiac murmur    • History of rheumatic fever    • Indigestion    • Irregular bowel habits    • Kidney f not drink alcohol or use drugs.     Allergies:    Vasotec [Enalapril]     ANAPHYLAXIS, SWELLING  Amlodipine              OTHER (SEE COMMENTS)    Comment:Swollen feet             numbness  Atorvastatin            OTHER (SEE COMMENTS)    Comment:Very weak and rate 18, height 5' 3\" (1.6 m), weight 113 lb (51.3 kg), SpO2 95 %.   Temp (24hrs), Av.4 °F (36.9 °C), Min:98 °F (36.7 °C), Max:98.7 °F (37.1 °C)    Wt Readings from Last 3 Encounters:  07/15/19 : 113 lb (51.3 kg)  19 : 114 lb (51.7 kg)  19

## 2019-07-16 NOTE — PLAN OF CARE
Assumed patient care 0700 per day shift. Patient alert/orientated. No c/o pain, patient feeling fatigued. VSS during shift, oxygen maintained on RA. Up to chair briefly. Tolerating small amounts of food, denies nausea.  Multiple small loose stools along wit injury  Description  (Example usage: patient with low platelets)  INTERVENTIONS:  - Avoid intramuscular injections, enemas and rectal medication administration  - Ensure safe mobilization of patient  - Hold pressure on venipuncture sites to achieve adequat

## 2019-07-16 NOTE — PROGRESS NOTES
CT HOSPITALIST  Progress Note     Becky Garcia Patient Status:  Inpatient    1940 MRN [id number]   [facility] 7NE-A Attending Fernando Ernandez MD   Hosp Day # 1 PCP Lieutenant Isaac MD     Chief Complaint: abd pain    S: Patient feel Blocker)   • Pravastatin Sodium  20 mg Oral Nightly   • Insulin Aspart Pen  1-10 Units Subcutaneous TID AC and HS       ASSESSMENT / PLAN:     1. Abd pain/diarrhea  1.  Need to quantify diarrhea as pt's definition of diarrhea seems to be not in line with se

## 2019-07-16 NOTE — HISTORICAL OFFICE NOTE
Génesis Stroud MD - 2018 12:00 AM CST  Paty Diaz  : 1940  ACCOUNT: [id number]  [phone]  PCP: Dr. Brii Chavez    TODAY'S DATE: 2018  DICTATED BY: [Dr. Purnima Junior    CHIEF COMPLAINT: Trina cali.]    HPI:   [On 2018, EXERCISE: no regular exercise. DIET: no special diet.      ALLERGIES: Vasotec - Oral    MEDICATIONS: Selected prescriptions see below    VITAL SIGNS: [B/P - 110/60 , Pulse - 60, Weight - 136, Height - 63 , BMI - 24.1 ]    CONS: well developed, well nourishe 12/20/18 Simvastatin 10MG one tablet daily   12/20/18 Simvastatin 20MG one tablet daily   12/20/18 Spironolactone 25MG one half tablet daily   12/20/18 Tacrolimus 1MG two pills in the morning one at [facility]

## 2019-07-17 PROCEDURE — 99232 SBSQ HOSP IP/OBS MODERATE 35: CPT | Performed by: INTERNAL MEDICINE

## 2019-07-17 NOTE — PHYSICAL THERAPY NOTE
PT consult received. Pt with vascular surgery consult secondary to AAA endoleak. Will continue to follow and see when appropriate.

## 2019-07-17 NOTE — CONSULTS
[facility]  Report of Consultation    Mily Lockett Patient Status:  Inpatient    1940 MRN [id number]   [facility] 7NE-A Attending Claire Oh MD   Hosp Day # 2 PCP Sylvia Rae MD       Assessment / Plan:    1) Diarrhea- on hyperlipidemia    • Polycystic kidney disease    • Rectal cancer [address] [address]    • Renal disorder     kidney transplant 3/7/2009   • Shortness of breath    • Sleep disturbance    • Stented coronary artery    • Unspecified essential hypertension    • Vomiting    • COMMENTS)    Comment:bleeding    Medications:    Current Facility-Administered Medications:   •  iron sucrose (VENOFER) IV Push 200 mg, 200 mg, Intravenous, Daily  •  tacrolimus (PROGRAF) cap 1 mg, 1 mg, Oral, QAM  •  tacrolimus (PROGRAF) cap 0.5 mg, 0.5 m 7/17/2019 1124  Last data filed at 7/17/2019 0900  Gross per 24 hour   Intake 480 ml   Output 850 ml   Net -370 ml     Wt Readings from Last 3 Encounters:  07/15/19 : 113 lb  06/27/19 : 114 lb  06/13/19 : 115 lb 9.6 oz    General: awake alert  HEENT: No sc

## 2019-07-17 NOTE — PLAN OF CARE
Assumed patient care per day shift. Patient alert/orientated. VSS during shift, oxygen maintained on RA. EKG completed, paper copy in patient chart. No c/o pain at this time. Abdomen soft, bowels (+). Patient up to BR frequently for bowel movements. signs for trends  - Administer supportive blood products/factors, fluids and medications as ordered and appropriate  - Administer supportive blood products/factors as ordered and appropriate  Outcome: Progressing  Goal: Free from bleeding injury  Descripti

## 2019-07-17 NOTE — PROGRESS NOTES
CT HOSPITALIST  Progress Note     Karine Spikes Patient Status:  Inpatient    1940 MRN [id number]   [facility] 7NE-A Attending Elvis Gtz MD   Hosp Day # 2 PCP Terence Weiner MD     Chief Complaint: Diarrhea    S: Patient comp Nightly   • Methenamine Hippurate  1 g Oral BID   • Pantoprazole Sodium  40 mg Oral QAM AC   • metoprolol Tartrate  25 mg Oral 2x Daily(Beta Blocker)   • Pravastatin Sodium  20 mg Oral Nightly   • Insulin Aspart Pen  1-10 Units Subcutaneous TID AC and HS

## 2019-07-17 NOTE — DIETARY MALNUTRITION NOTE
[facility]    NUTRITION INITIAL ASSESSMENT    Pt meets non-severe, acute malnutrition criteria.     CRITERIA FOR MALNUTRITION DIAGNOSIS:  Criteria for non-severe malnutrition diagnosis: acute illness/injury related to wt loss 7.5% in 3 months and muscl boiled egg, gluten free toast, yogurt w/fruit  Lunch: tomato soup, fruit, or pretzels  Dinner: 1/2 sandwich with gluten free bread and deli meat. Pt reports \"dinner is a problem\" because she is not hungry.      ANTHROPOMETRICS:  Ht: 160 cm (5' 3\")  Wt: [facility] [place]  Pager #[phone]

## 2019-07-17 NOTE — CONSULTS
[facility]                       [facility]    Luis Aguayo Patient Status:  Inpatient    1940 MRN [id number]   [facility] 7NE-A Attending Faye Oliveira MD   [address] Day # 2 PCP Meagan Chatman denies fevers, fresh water swimming, recent sick contacts, changes in dietary patterns, known dietary indiscretions, recent travel, recent abx, and routine NSAID use.      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[address]     left arm-MARIAM • HERNIA SURGERY     • KIDNEY SURGERY     • NEEDLE BIOPSY LEFT      1960's   • ORGAN TRANSPLANT     • OTHER      partial liver removal   • OTHER SURGICAL HISTORY     • TONSILLECTOMY     • TRANSPLANTATION OF KIDNEY  3/7/2009    right kidney   • TRANSPLANT numbness  Atorvastatin            OTHER (SEE COMMENTS)    Comment:Very weak and pain in legs, syncope  Clopidogrel             OTHER (SEE COMMENTS)    Comment:bleeding  SocHx:  Quit smoking 2007;  The patient denies alcohol intake;  The patient has n Clear to auscultation bilaterally without wheezes; rubs, rhonchi, or rales  Abdomen: Soft, non-tender, non-distended with the presence of bowel sounds; No hepatosplenomegaly; no rebound or guarding;  No ascites is clinically apparent; no tympany to percussi ___________________________________________________________________  Colonoscopy: 3/2019: Dr Osman Ortega  Indication: Diarrhea  Findings:   1. Cecum was reached.   2. The exam from cecum to rectum fails to reveal any active inflammatory changes.  Random biops reports that the diarrhea can wake her up at night. Per renal, will also see if holding Myfortic will help.      Arely Andre,   3:04 PM  7/17/2019  [facility]  [phone]

## 2019-07-17 NOTE — PROGRESS NOTES
[facility]  Cardiology Progress Note    Marilyn Santoyo Patient Status:  Inpatient    1940 MRN [id number]   [facility] 7NE-A Attending Chilango Mayer MD   Hosp Day # 2 PCP Isaac Lovett MD     Subjective:  Still with complaints of 1-10 Units Subcutaneous TID AC and HS         Assessment:  · Fatigue, weakness possibly secondary to dehydration from diarrhea  · Abdominal aortic aneurysm s/p graft placement and endoleak type II or III  · History of renal transplant seconadyr to ESRD  ·

## 2019-07-17 NOTE — PLAN OF CARE
Assumed care at [address]. More fatigued than yesterday evening. Denies pain/discomfort at this time. Up to BR frequently to have bowel movements. 4 BMs overnight. See charting.        Problem: Patient/Family Goals  Goal: Patient/Family Long Term Goal  Cyrus sites to achieve adequate hemostasis  - Assess for signs and symptoms of internal bleeding  - Monitor lab trends  Outcome: Progressing     Problem: MUSCULOSKELETAL - ADULT  Goal: Return mobility to safest level of function  Description  INTERVENTIONS:  - A

## 2019-07-18 PROCEDURE — 99232 SBSQ HOSP IP/OBS MODERATE 35: CPT | Performed by: NURSE PRACTITIONER

## 2019-07-18 NOTE — PLAN OF CARE
A/O, RA, VSS  Denies pain at this time  Three episodes of loose/ yellow stool throughout the night  Pt w/ poor appetite, BS stable  Needs attended to, Will cont to monitor

## 2019-07-18 NOTE — PLAN OF CARE
Assumed care @ 0700. Pt a/o x4, VSS. Tele SR. No acute respiratory distress noted. Denies any pain. Pt ambulated to the bathroom with SBA.    (+) BM, diarrhea - see flowsheet for details.     Discharge planning - waiting for call back from consulti from bleeding injury  Description  (Example usage: patient with low platelets)  INTERVENTIONS:  - Avoid intramuscular injections, enemas and rectal medication administration  - Ensure safe mobilization of patient  - Hold pressure on venipuncture sites to a

## 2019-07-18 NOTE — CM/SW NOTE
Alondra Hardy from PT called to say that she is recommending HHPT. Referral made to [facility] who will see pt.

## 2019-07-18 NOTE — HOME CARE LIAISON
MET WITH PTNT AND OFFERED CHOICE  OF AGENCIES. PTNT AGREEABLE TO [facility]. MET WITH PTNT TO DISCUSS HOME HEALTH SERVICES AND COVERAGE CRITERIA. PTNT AGREEABLE TO [facility]. PTNT GIVEN RESIDENTIAL BROCHURE.  RESIDENTIAL WITH PROVIDE PT ON DISCHAR

## 2019-07-18 NOTE — PROGRESS NOTES
[facility]  Cardiology Progress Note    Ivett Ruiz Patient Status:  Inpatient    1940 MRN [id number]   [facility] 7NE-A Attending Royl Stanford MD   Hosp Day # 3 PCP Diana Rodriguez MD     Subjective:  Diarrhea increased last secondary to dehydration from diarrhea  · Chronic Diarrhea - s/o unremarkable EGD/colonoscopy. Myfortic discontinued last night due to possible drug side effect  · Abdominal aortic aneurysm s/p graft placement and endoleak type II or III.  No change in endo

## 2019-07-18 NOTE — PROGRESS NOTES
Gastroenterology Progress Note  Cliff Peace Patient Status:  Inpatient    1940 MRN [id number]   [facility] 7NE-A Attending Remedios Garcia MD   Hosp Day # 3 PCP Jinny Darden MD     Ch performed with oral contrast including 1100 mL methylcellulose. 1 mg of IV glucagon was given prior to imaging.  Single shot fast spin echo breath hold and axial and coronal T-1 weighted 3-D gradient echo sequences with   additional steady state free preces abnormality; stool electrolytes pending. Drug induced diarrhea remain in differential, Myfortic now on hold   2. Decreased PO intake with weight loss  3. ESRD s/p renal transplant on chronic anti-rejection medications   4.  Iron deficiency anemia: no overt

## 2019-07-18 NOTE — PHYSICAL THERAPY NOTE
PHYSICAL THERAPY QUICK EVALUATION - INPATIENT    Room Number: 9428/6261-W  Evaluation Date: 7/18/2019  Presenting Problem: diarrhea  Physician Order: PT Eval and Treat    Problem List  Principal Problem:    Weakness generalized  Active Problems:    AAA ( Brothers   • COLONOSCOPY     • ENDOVAS AAA REPR W/3-P PART     • ENDOVAS REPAIR, INFRARENL ABDOM AORTIC ANEURYSM/DISSECT     • ESOPHAGOGASTRODUODENOSCOPY (EGD) N/A 6/2/2019    Performed by Elle Linton MD at [address] [address] N/ the side of the bed?: None   How much help from another person does the patient currently need. ..   -   Moving to and from a bed to a chair (including a wheelchair)?: A Little   -   Need to walk in hospital room?: A Little   -   Climbing 3-5 steps with a r

## 2019-07-18 NOTE — PROGRESS NOTES
CT HOSPITALIST  Progress Note     Ramon Nunn Patient Status:  Inpatient    1940 MRN [id number]   [facility] 7NE-A Attending Christos Martel MD   Hosp Day # 3 PCP Morris Cisneros MD     Chief Complaint: Diarrhea    S: Patient seen with breakfast   • latanoprost  1 drop Both Eyes Nightly   • Methenamine Hippurate  1 g Oral BID   • Pantoprazole Sodium  40 mg Oral QAM AC   • metoprolol Tartrate  25 mg Oral 2x Daily(Beta Blocker)   • Pravastatin Sodium  20 mg Oral Nightly   • Insulin As

## 2019-07-18 NOTE — PROGRESS NOTES
[facility]  Nephrology Progress Note    Tremayne Huston Attending:  Ronel Singh MD       Assessment and Plan:    1) Diarrhea- ongoing x 4 months, s/p stool studies, EGD, colonoscopy, etc without clear diagnosis; trial of anti-diarrheals, gluten-fr 07/18/2019    .0 07/18/2019    CREATSERUM 0.75 07/18/2019    BUN 18 07/18/2019     07/18/2019    K 3.8 07/18/2019     07/18/2019    CO2 23.0 07/18/2019     07/18/2019    CA 8.3 07/18/2019    ALB 2.6 07/18/2019    ALKPHO 83 07/18/2

## 2019-07-19 PROCEDURE — 99232 SBSQ HOSP IP/OBS MODERATE 35: CPT | Performed by: NURSE PRACTITIONER

## 2019-07-19 NOTE — PROGRESS NOTES
Gastroenterology Progress Note  Green Bigger Patient Status:  Inpatient    1940 MRN [id number]   [facility] 7NE-A Attending Gi Li MD   Hosp Day # 4 PCP Johanne Powell MD     Ch anti-rejection medications   4. Iron deficiency anemia: no overt GI bleeding, remains stable; s/p EGD and colonoscopy this year   5. DM  6. CAD  Plan:   1. Start Lomotil TID AC; maybe able to decrease dosing if diarrhea is d/t Myfortic   2.  Await stool lavon

## 2019-07-19 NOTE — CONSULTS
[facility]    PATIENT'S NAME: Michael Sparks   ATTENDING PHYSICIAN: SABRINA Topete: Felicity Jama M.D.    PATIENT ACCOUNT#:   [de-identified]    LOCATION:  [address] [place] [address]  MEDICAL RECORD #:   [id number]       DATE OF BIRTH SOCIAL HISTORY:  Patient is retired at this time. FAMILY HISTORY:  Coronary artery disease. REVIEW OF SYSTEMS:  The patient has no chest pain or shortness of breath. Denies any recent CVA, TIA, visual field defect, or aphasia.   Has some mild los Lucía Stock M.D.   Sharmila Jaime M.D.    Aime Newman M.D.

## 2019-07-19 NOTE — PLAN OF CARE
Assumed care @ 0700. Pt a/o x4, VSS. Tele SR. No acute respiratory distress noted. Denies any pain. Pt ambulated to the bathroom. (+) BM, Watery/Soft - see flowsheet for details.     Started Lomotil @ 2 PM.  2 loose/soft bowel movements between injury  Description  (Example usage: patient with low platelets)  INTERVENTIONS:  - Avoid intramuscular injections, enemas and rectal medication administration  - Ensure safe mobilization of patient  - Hold pressure on venipuncture sites to achieve adequat

## 2019-07-19 NOTE — PROGRESS NOTES
[facility]  Nephrology Progress Note    Khushboo Callahan Attending:  Sharmila Tai MD       Assessment and Plan:    1) Diarrhea- ongoing x 4 months, s/p stool studies, EGD, colonoscopy, etc without clear diagnosis; trial of anti-diarrheals, gluten-fr BUN 16 07/19/2019     07/19/2019    K 3.8 07/19/2019     07/19/2019    CO2 23.0 07/19/2019     07/19/2019    CA 8.6 07/19/2019    MG 1.4 07/19/2019    PGLU 150 07/19/2019       Imaging: All imaging studies reviewed.     Meds:     Current

## 2019-07-19 NOTE — PROGRESS NOTES
[facility]  Cardiology Progress Note    Green Bigger Patient Status:  Inpatient    1940 MRN [id number]   [facility] 7NE-A Attending Gi Li MD   Hosp Day # 4 PCP Johanne Powell MD     Subjective:  Patient with c/o fatigue dehydration from diarrhea  · Chronic Diarrhea - s/o unremarkable EGD/colonoscopy. Myfortic discontinued due to possible drug side effect  · Abdominal aortic aneurysm s/p graft placement and endoleak type II or III.  No change in endoleak or size of the aneu

## 2019-07-20 NOTE — PROGRESS NOTES
[facility]  Nephrology Progress Note    Luis Aguayo Attending:  Faye Oliveira MD       Subjective:  Ready for home today      Current Facility-Administered Medications:  diphenoxylate-atropine (LOMOTIL) 2.5-0.025 MG per tab 1 tablet 1 tablet Martha Alejandre Intake 240 ml   Output —   Net 240 ml     Wt Readings from Last 3 Encounters:  07/15/19 : 113 lb  06/27/19 : 114 lb  06/13/19 : 115 lb 9.6 oz    General: awake alert  HEENT: No scleral icterus, MMM  Neck: Supple, no WILLOW or thyromegaly  Cardiac: Regular r

## 2019-07-20 NOTE — PROGRESS NOTES
Caesar Caballero for dc from all services   IV removed, tele removed   Medication and follow up instructions reviewed with patient and daughter   Plan for dc home with [facility]   Discharged with all belongings

## 2019-07-20 NOTE — PROGRESS NOTES
CT HOSPITALIST  Progress Note     Annette Mckeon Patient Status:  Inpatient    1940 MRN [id number]   [facility] [place] 7NE-A Attending Lilian Lakhani MD   Hosp Day # 5 PCP Len Odom MD     Chief Complaint: Diarrhea    S: Patient doin azathioprine  50 mg Oral Daily   • diphenoxylate-atropine  1 tablet Oral TID AC   • iron sucrose  200 mg Intravenous Daily   • tacrolimus  1 mg Oral QAM   • tacrolimus  0.5 mg Oral Nightly   • predniSONE  5 mg Oral Daily with breakfast   • latanoprost  1 d

## 2019-07-20 NOTE — PROGRESS NOTES
Gastroenterology Progress Note  Tj Nugent Patient Status:  Inpatient    1940 MRN [id number]   [facility] 7NE-A Attending Kaela Caceres MD   Hosp Day # 5 PCP Melissa Knowles MD     Ch bleeding, remains stable; s/p EGD and colonoscopy this year   5. DM  6. CAD  Plan:   1. Increase Lomotil TID prior to meals and at bedtime; maybe able to decrease dosing if diarrhea is d/t Myfortic once improved  2.  If no improvement with holding Myfortic,

## 2019-07-20 NOTE — PLAN OF CARE
Assumed patient care at 0730. VSS. Generalized weakness. Patient reports 2 loose Bms overnight. None today   Low appetite.  Food and fluids encouraged   Anticipated dc today       Problem: Patient/Family Goals  Goal: Patient/Family Long Term Goal  Descr

## 2019-07-20 NOTE — PLAN OF CARE
Assumed care of pt at 1900. A&Ox4, VSS on RA. SR on tele. Had two loose/watery stools over night, was incontinent of one stool. Reports a poor appetite. Denies any pain, nausea. Up with sba to the bathroom. D/C planning to home. Needs attended to.  Will mon

## 2019-07-21 PROBLEM — R50.9 FEVER, UNSPECIFIED FEVER CAUSE: Status: ACTIVE | Noted: 2019-01-01

## 2019-07-21 PROBLEM — R50.9 FEVER: Status: ACTIVE | Noted: 2019-01-01

## 2019-07-21 NOTE — PLAN OF CARE
Patient received this am from ER via stretcher. Lungs clear, diminished, on room air, abdomen soft, bowel sounds present. Continent of bowel and bladder. IVF started, am medications administered, patient ordered diet, denies pain, n/v/d and SOB.  Multiple s

## 2019-07-21 NOTE — CONSULTS
INFECTIOUS DISEASE CONSULT NOTE    Leticia Lima Patient Status:  Inpatient    1940 MRN [id number]   [facility] 4NW-A Attending Nikolai Lovett, DO   Hosp Day # 0 PCP Deepti Lima, not since 2009   • Diarrhea, unspecified    • Disorder of liver     polycystic cyst in Liver, part of the liver removed 1994   • Diverticulosis of large intestine    • Dizziness    • Easy bruising    • Facial basal cell cancer    • Flatulence/gas pain/b liver disease   • Stroke Daughter         Polycystic liver disease      reports that she quit smoking about 12 years ago. She has a 30.00 pack-year smoking history.  She has never used smokeless tobacco. She reports that she does not drink alcohol or use dr oz (51.8 kg), SpO2 97 %. Temp (24hrs), Av.8 °F (37.1 °C), Min:97.7 °F (36.5 °C), Max:100.7 °F (38.2 °C)  HEENT: no scleral icterus or conjunctival injection. Moist mucous membranes. Neck: No lymphadenopathy. Supple.   Respiratory: Clear to auscultat CHEST AP PORTABLE  (CPT=71045)  TECHNIQUE:  AP chest radiograph was obtained. COMPARISON:  [facility] , XR CHEST PA + LAT CHEST (CPT=71046), 7/15/2019, 13:18.   INDICATIONS:  fever, weakness  PATIENT STATED HISTORY: (As transcribed by Technologist)  Pt arrives the aneurysm sac on delayed images. This may be due to a lumbar artery. This may be due to graft failure at this location. High density otherwise in the aneurysmal sac is stable. The common iliac stent grafts are widely patent.   Bilateral external andrew Femoral Artery: Tortuosity:      Mild      Calcification:   Mild      Minimum luminal diameters:           Common iliac:     10 mm            External iliac:  7 mm           Common femoral:  7 mm       CONCLUSION:   1.  Infrarenal abdominal aortic aneu progress.     Ruby Aparicio  7/21/2019  11:26 AM

## 2019-07-21 NOTE — PROGRESS NOTES
Cm Godinez is a 67year old female with a history of renal transplant for which she takes prednisone 5 mg daily, azathioprine 50 mg daily and Tacrolimus 1.5 mg total daily (tacrolimus

## 2019-07-21 NOTE — ED PROVIDER NOTES
Patient Seen in: [facility] Emergency Department    History   Patient presents with:  Fever (infectious)    Stated Complaint: fever, weakness    HPI    67year old female with a past medical history of renal transplant due PCKD in 2009, AAA s/p endo • High blood pressure    • High cholesterol    • History of cardiac murmur    • History of rheumatic fever    • Indigestion    • Irregular bowel habits    • Kidney failure    • Nausea    • Night sweats    • Osteoarthritis    • Osteoporosis    • Other and ED Triage Vitals [07/21/19 0333]   /69   Pulse 101   Resp 18   Temp (!) 100.7 °F (38.2 °C)   Temp src Temporal   SpO2 92 %   O2 Device None (Room air)       Current:/63   Pulse 89   Temp (!) 100.7 °F (38.2 °C) (Temporal)   Resp 16   Wt 51 k Neutrophil Absolute Prelim 13.72 (*)     Neutrophil Absolute 13.72 (*)     Lymphocyte Absolute 0.30 (*)     All other components within normal limits   LACTIC ACID, PLASMA - Normal   CBC WITH DIFFERENTIAL WITH PLATELET    Narrative:      The following order

## 2019-07-21 NOTE — RESPIRATORY THERAPY NOTE
Nasopharyngeal swab performed for  Resp. FLU panel expanded  ; specimen obtained, labeled and sent to lab. All ppe worn during procedure. Well tolerated by patient.

## 2019-07-21 NOTE — H&P
CT HOSPITALIST  History and Physical     Jud Prieto Patient Status:  Emergency    1940 MRN [id number]   Location [address] [facility] Attending Ragini Klein,    Hosp Day # 0 PCP Ramon Royal MD     Chief Complain Vomiting    • Wears glasses    • Weight loss         Past Surgical History:   Past Surgical History:   Procedure Laterality Date   • CATARACT     • CATH PERCUTANEOUS  TRANSLUMINAL CORONARY ANGIOPLASTY     • COLONOSCOPY  6/2013 fall 2017    rosalio 4 yrs  S MG Oral Tab EC Take 1 tablet (325 mg total) by mouth daily with breakfast. Disp: 30 tablet Rfl: 2   azathioprine 50 MG Oral Tab Take 1 tablet (50 mg total) by mouth daily.  Disp: 30 tablet Rfl: 11   diphenoxylate-atropine (LOMOTIL) 2.5-0.025 MG Oral Tab [facility] negatives noted in the HPI. Physical Exam:    /63   Pulse 83   Temp (!) 100.7 °F (38.2 °C) (Temporal)   Resp 23   Wt 112 lb 7 oz (51 kg)   SpO2 90%   BMI 19.92 kg/m²   General: No acute distress. Alert and oriented x 3.  fatigued  HEENT: dry mucous 07/15/19  1204   TROP <0.045       Imaging: Imaging data reviewed in Epic. ASSESSMENT / PLAN:     1. Fever , weakness  1. Unclear etiology  2. ID on CS- request holding abx  3. Blood Cs  4. Flu panel  5. CXR  2.  Recent diarrhea work up  1. c diff neg

## 2019-07-22 NOTE — PROGRESS NOTES
CT HOSPITALIST  Progress Note     Luis Aguayo Patient Status:  Inpatient    1940 MRN [id number]   [facility] 4NW-A Attending June Richards, [address] [facility] Day # 1 PCP Alessia Chacon MD     Chief Complaint: Diarrhea/fever    S: Patient AST 24  --   --  16  --    ALT 27  --   --  26  --    BILT 0.6  --   --  0.5  --    TP 5.8*  --   --  5.9*  --        Estimated Creatinine Clearance: 60.2 mL/min (based on SCr of 0.63 mg/dL). No results for input(s): PTP, INR in the last 168 hours. discharge, patient will require TBD.

## 2019-07-22 NOTE — PROGRESS NOTES
[facility] Pharmacy Note:  Renal Adjustment for meropenem (MERREM)    Jadon Cooper is a 67year old female who has been prescribed meropenem (MERREM) 500 mg every 8 hrs. CrCl is estimated creatinine clearance is 43.1 mL/min (based on SCr of 0.88 mg/dL).  so th

## 2019-07-22 NOTE — PLAN OF CARE
Problem: RESPIRATORY - ADULT  Goal: Achieves optimal ventilation and oxygenation  Description  INTERVENTIONS:  - Assess for changes in respiratory status  - Assess for changes in mentation and behavior  - Position to facilitate oxygenation and minimize r wounds/incisions during mobilization  - Obtain PT/OT consults as needed  - Advance activity as appropriate  - Communicate ordered activity level and limitations with patient/family  Outcome: Progressing     Problem: RISK FOR INFECTION - ADULT  Goal: Absenc

## 2019-07-22 NOTE — CM/SW NOTE
07/22/19 1100   CM/SW Referral Data   Referral Source Social Work (self-referral)   Reason for Referral Discharge planning   Informant Patient   Readmission Assessment   Factors that patient feels contributed to this readmission   (fever, fall)   Was fu

## 2019-07-22 NOTE — PROGRESS NOTES
[facility] Pharmacy Note:  Renal Adjustment for meropenem (MERREM)    Jadon Cooper is a 67year old female who has been prescribed meropenem (MERREM) 500 mg every 12 hrs. CrCl is estimated creatinine clearance is 60.2 mL/min (based on SCr of 0.63 mg/dL).  so t

## 2019-07-22 NOTE — PLAN OF CARE
Awake , alert , oriented , respirations easy , skin warm , multiple scattered bruises , stools small amt , x2 in am , loose , left arm w/ old AV fistula site , Right upper arm IV site , bruised , edema to lower arm noted , resistance to IV flush , IV team

## 2019-07-22 NOTE — PHYSICAL THERAPY NOTE
PHYSICAL THERAPY EVALUATION - INPATIENT     Room Number: 403/403-A  Evaluation Date: 7/22/2019  Type of Evaluation: Initial  Physician Order: PT Eval and Treat    Presenting Problem: Fever  Reason for Therapy: Mobility Dysfunction and Discharge Plann 6/2013 fall 2017    rosalio 3 yrs  Madi Gan Brothers   • COLONOSCOPY     • ENDOVAS AAA REPR W/3-P PART     • ENDOVAS REPAIR, INFRARENL ABDOM AORTIC ANEURYSM/DISSECT     • ESOPHAGOGASTRODUODENOSCOPY (EGD) N/A 6/2/2019    Performed by Jones Craig MD at Standing: Fair +  Dynamic Standing: Fair    ADDITIONAL TESTS                                    NEUROLOGICAL FINDINGS                      ACTIVITY TOLERANCE                         O2 WALK                  AM-PAC '6-Clicks' INPATIENT SHORT FORM - BASIC MO within reach;RN aware of session/findings; All patient questions and concerns addressed    ASSESSMENT   Patient is a 67year old female admitted on 7/21/2019 for fever.   Pertinent comorbidities and personal factors impacting therapy include multiple recent

## 2019-07-22 NOTE — CM/SW NOTE
CM spoke with daughter re: discharge plan home with [facility]. Daughter concerned this is not safe, as she feels her mother is too weak to go home and needs SHERI. Daughter informed of Physical therapy evaluation.  Daughter given resources for home caregiver, respi

## 2019-07-22 NOTE — PROGRESS NOTES
[facility]                INFECTIOUS DISEASE PROGRESS NOTE    Gene Milligan Patient Status:  Inpatient    1940 MRN [id number]   [facility] 4NW-A Attending Yee Bailey DO   Hosp Day # 1 PCP Coco Lmeon MD     Antibiotics Ketones Urine Negative mg/dL     Blood Urine SmallAbnormal     pH Urine 5.0    Protein Urine 30 Abnormal  mg/dl     Urobilinogen Urine <2.0 mg/dL     Nitrite Urine Negative    Leukocyte Esterase Urine Negative    WBC Urine 11-20Abnormal  /HPF     RBC URINE Negative    Entamoeba Histolytica Pcr Negative    Giardia Lamblia Pcr Negative    Adenovirus F 40/41 Pcr Negative    Astrovirus Pcr Negative    Norovirus Gi/Gii Pcr Negative    Rotavirus A Pcr Negative    Sapovirus Pcr Positive     CMV, REAL TIME PCR, CJ reviewed:  Patient Active Problem List:     Polycystic kidney disease     Hypercholesterolemia     Essential hypertension     Glaucoma     Age related osteoporosis     Elevated PTHrP level     Elevated glucose     Coronary artery disease involving native c

## 2019-07-22 NOTE — CM/SW NOTE
Referral sent to [facility] to evaluate for short rehab stay. DON called.      Lucero CHATMAN, 07/22/19, 1:59 PM

## 2019-07-23 NOTE — PROGRESS NOTES
[facility]                INFECTIOUS DISEASE PROGRESS NOTE    Murtis Deal Patient Status:  Inpatient    1940 MRN [id number]   [facility] 4NW-A Attending Renetta Salas DO   Hosp Day # 2 PCP Priscilla Garza MD     Antibiotics Bilirubin Urine Negative    Ketones Urine Negative mg/dL     Blood Urine SmallAbnormal     pH Urine 5.0    Protein Urine 30 Abnormal  mg/dl     Urobilinogen Urine <2.0 mg/dL     Nitrite Urine Negative    Leukocyte Esterase Urine Negative    WBC Urine 11-2 TO RESULTS REPORTED PRIOR TO MAY 31st, 2018. **  CMV VIRAL DNA ANALYSIS PERFORMED USING MCKE[employer] FDA APPROVED REAL TIME PCR.   METHOD REPORTABLE RANGE 1.70 TO 6.0 LOG IU/ML (50-1,000,000 IU/ML) CMVND LOG IU/ML   Specimen Collected on   PLASMA 6/19/2019 4:00 AM Infrarenal abdominal aortic aneurysm. POSTOPERATIVE DIAGNOSIS:   Infrarenal abdominal aortic aneurysm. PROCEDURE:   1. Open left femoral artery exposure with femoral endarterectomy and bovine pericardial patch angioplasty.   2. Nonselective aortic cat -ho renal transplant immunosuppressed  Chronic diarrhea - stool positive for sapovirus  Urine negative, prior to antibiotics    CT showing AAA endograft with possible leak  --had endograft done at [facility] in 2017    Indium scan assess for possible infected

## 2019-07-23 NOTE — CM/SW NOTE
SW spoke w/pt's daughter regarding dc plan. [facility] is able to accept. Pt is still pending at [facility] (they are tight on beds). Daughter stated she is fine with the pt going to either facility.  MARCIANO to met w/pt and daughter together later today to confirm the dc

## 2019-07-23 NOTE — PHYSICAL THERAPY NOTE
Attempted to see Pt this AM - RN Cynthia aware of attempt. Pt currently off floor at [facility]. Will f/u later today if time permits, after all other patients are attempted per tentative schedule.

## 2019-07-23 NOTE — PROGRESS NOTES
CT HOSPITALIST  Progress Note     Cliff Peace Patient Status:  Inpatient    1940 MRN [id number]   [facility] 4NW-A Attending Vangie Granda, [address] [facility] Day # 2 PCP Jinny Darden MD     Chief Complaint: Diarrhea/fever    S: Patient 0.6  --   --  0.5  --    TP 5.8*  --   --  5.9*  --        Estimated Creatinine Clearance: 60.2 mL/min (based on SCr of 0.63 mg/dL). No results for input(s): PTP, INR in the last 168 hours. No results for input(s): TROP, CK in the last 168 hours.

## 2019-07-23 NOTE — PLAN OF CARE
Problem: RESPIRATORY - ADULT  Goal: Achieves optimal ventilation and oxygenation  Description  INTERVENTIONS:  - Assess for changes in respiratory status  - Assess for changes in mentation and behavior  - Position to facilitate oxygenation and minimize r transfers and ambulation   Outcome: Progressing   Pt is alert and oriented X4, VSS, afebrile, no complaints of pain. Pt ambulates with x1 assist, uses the [facility] and walks to the BR. Continuing IVF, is receiving IVabx every 8hrs, no reaction noted.  Pt is rest

## 2019-07-24 NOTE — PLAN OF CARE
Pt alert and oriented with periods of forgetfulness. VSS, afebrile. Denies pain or SOB. IV antibiotics given per MAR. SpO2 within normals limits on room air. Isolation precautions remain in place.  Ambulatory to the bathroom with assist. Fall precautions in patient's medication profile  Outcome: Progressing     Problem: SKIN/TISSUE INTEGRITY - ADULT  Goal: Skin integrity remains intact  Description  INTERVENTIONS  - Assess and document risk factors for pressure ulcer development  - Assess and document skin in mobility/gait  Description  Interventions:  - Assess patient's functional ability and stability  - Promote increasing activity/tolerance for mobility and gait  - Educate and engage patient/family in tolerated activity level and precautions  - Recommend use

## 2019-07-24 NOTE — PHYSICAL THERAPY NOTE
PHYSICAL THERAPY TREATMENT NOTE - INPATIENT    Room Number: 403/403-A     Session: 1   Number of Visits to Meet Established Goals: 3    Presenting Problem: Fever    Problem List  Principal Problem:    Fever, unspecified fever cause  Active Problems:    Fe ESOPHAGOGASTRODUODENOSCOPY (EGD) N/A 6/2/2019    Performed by Ivonne Zarco MD at [address] [place] [address] 3/29/2019    Performed by Sugar Garcia MD at [facility] ENDOSCOPY   • FRACTURE SURGERY      left wrist   • HERNIA SURGERY Modifier (G-Code): CJ    FUNCTIONAL ABILITY STATUS  Gait Assessment   Gait Assistance: Supervision  Distance (ft): 300  Assistive Device: Rolling walker  Pattern: Shuffle  Stoop/Curb Assistance: Not tested  Comment : above scores based on dept protocol education; Family education;Gait training;Strengthening;Stair training;Transfer training;Balance training  Rehab Potential : Good  Frequency (Obs): 5x/week    CURRENT GOALS     Goal #1 Patient is able to demonstrate supine - sit EOB @ level: modified indepe

## 2019-07-24 NOTE — PROGRESS NOTES
[facility]                INFECTIOUS DISEASE PROGRESS NOTE    Servando Dupree Patient Status:  Inpatient    1940 MRN [id number]   [facility] 4NW-A Attending Felicita Melendez, DO   Hosp Day # 3 PCP Raquel Kendrick MD     Antibiotics Bilirubin Urine Negative    Ketones Urine Negative mg/dL     Blood Urine SmallAbnormal     pH Urine 5.0    Protein Urine 30 Abnormal  mg/dl     Urobilinogen Urine <2.0 mg/dL     Nitrite Urine Negative    Leukocyte Esterase Urine Negative    WBC Urine 11-2 TO RESULTS REPORTED PRIOR TO MAY 31st, 2018. **  CMV VIRAL DNA ANALYSIS PERFORMED USING [employer] FDA APPROVED REAL TIME PCR.   METHOD REPORTABLE RANGE 1.70 TO 6.0 LOG IU/ML (50-1,000,000 IU/ML) CMVND LOG IU/ML   Specimen Collected on   PLASMA 6/19/2019 4:00 AM Infrarenal abdominal aortic aneurysm. POSTOPERATIVE DIAGNOSIS:   Infrarenal abdominal aortic aneurysm. PROCEDURE:   1. Open left femoral artery exposure with femoral endarterectomy and bovine pericardial patch angioplasty.   2. Nonselective aortic cat -ho renal transplant immunosuppressed  Chronic diarrhea - stool positive for sapovirus  Urine negative, prior to antibiotics    CT showing AAA endograft with possible leak  --had endograft done at [facility] in 2017    Indium scan negative      Continue cefazo

## 2019-07-24 NOTE — PROGRESS NOTES
CT HOSPITALIST  Progress Note     Ivett Ruiz Patient Status:  Inpatient    1940 MRN [id number]   [facility] 4NW-A Attending Mckayla Young, [address] [facility] Day # 3 PCP Diana Rodriguez MD     Chief Complaint: Diarrhea/fever    S: Patient BILT 0.6  --   --  0.5  --    TP 5.8*  --   --  5.9*  --        Estimated Creatinine Clearance: 60.9 mL/min (based on SCr of 0.63 mg/dL). No results for input(s): PTP, INR in the last 168 hours.     No results for input(s): TROP, CK in the last 168 cristel

## 2019-07-24 NOTE — PLAN OF CARE
Problem: MUSCULOSKELETAL - ADULT  Goal: Return mobility to safest level of function  Description  INTERVENTIONS:  - Assess patient stability and activity tolerance for standing, transferring and ambulating w/ or w/o assistive devices  - Assist with trans morning and 2nd one to be done tomorrow,denies any pain,ivf at reg rates,up to bathroom w/ assist,needs attended, will continue to monitor.

## 2019-07-25 NOTE — PLAN OF CARE
Problem: RESPIRATORY - ADULT  Goal: Achieves optimal ventilation and oxygenation  Description  INTERVENTIONS:  - Assess for changes in respiratory status  - Assess for changes in mentation and behavior  - Position to facilitate oxygenation and minimize r breakdown  - Initiate interventions, skin care algorithm/standards of care as needed  Outcome: Progressing     Problem: MUSCULOSKELETAL - ADULT  Goal: Return mobility to safest level of function  Description  INTERVENTIONS:  - Assess patient stability and Indium scan completed. Had several small loose stools this shift. . Afebrile this shift

## 2019-07-25 NOTE — PLAN OF CARE
Pt needs to be seen by Dr Kurtis Cardozo CV surgery prior to discharge. Rn paged and informed office of consult. Per hospitalist hold discharge until pt is seen by CV service. Currently transportation is on will call.

## 2019-07-25 NOTE — PHYSICAL THERAPY NOTE
PHYSICAL THERAPY TREATMENT NOTE - INPATIENT    Room Number: 403/403-A     Session: 2   Number of Visits to Meet Established Goals: 3    Presenting Problem: Fever    Problem List  Principal Problem:    Fever, unspecified fever cause  Active Problems:    Fe ESOPHAGOGASTRODUODENOSCOPY (EGD) N/A 6/2/2019    Performed by Maicol Fisher MD at [address] [place] [address] 3/29/2019    Performed by Linsey Feldman MD at [facility] ENDOSCOPY   • FRACTURE SURGERY      left wrist   • HERNIA SURGERY CI    FUNCTIONAL ABILITY STATUS  Gait Assessment   Gait Assistance: Supervision  Distance (ft): 150  Assistive Device: Rolling walker  Pattern: Shuffle  Stoop/Curb Assistance: Not tested  Comment : above scores based on dept protocol    Skilled Therapy Pro with home health PT     PLAN  PT Treatment Plan: Bed mobility; Endurance; Patient education; Family education;Gait training;Strengthening;Stair training;Transfer training;Balance training  Rehab Potential : Good  Frequency (Obs): 5x/week    CURRENT GOALS

## 2019-07-25 NOTE — CM/SW NOTE
Discharge Plan Status:    Projected Destination: Subacute Rehab  Current Barriers to d/c: Medical Clearance; Pt/Family aware of plan: MSW spoke to Dtr at 4:22pm and she is agreeable to d/c and to [facility] and private medicar rates.     Rn to call report to:

## 2019-07-25 NOTE — PROGRESS NOTES
[facility]                INFECTIOUS DISEASE PROGRESS NOTE    Leticia Lima Patient Status:  Inpatient    1940 MRN [id number]   [facility] 4NW-A Attending Nikolai Lovett, DO   Hosp Day # 4 PCP Deepti Lima MD     Antibiotics pH Urine 5.0    Protein Urine 30 Abnormal  mg/dl     Urobilinogen Urine <2.0 mg/dL     Nitrite Urine Negative    Leukocyte Esterase Urine Negative    WBC Urine 11-20Abnormal  /HPF     RBC URINE 0-2 /HPF     Bacteria Urine None Seen    Squamous Epi.  Cells CMV VIRAL DNA ANALYSIS PERFORMED USING [employer] FDA APPROVED REAL TIME PCR.   METHOD REPORTABLE RANGE 1.70 TO 6.0 LOG IU/ML (50-1,000,000 IU/ML) CMVND LOG IU/ML   Specimen Collected on   PLASMA 6/19/2019 4:00 AM     Blood Culture FREQ X 2 [[id number]] (Abnorma POSTOPERATIVE DIAGNOSIS:   Infrarenal abdominal aortic aneurysm. PROCEDURE:   1. Open left femoral artery exposure with femoral endarterectomy and bovine pericardial patch angioplasty. 2. Nonselective aortic catheter placement.   3. Intravascular ultras Chronic diarrhea - stool positive for sapovirus  Urine negative, prior to antibiotics    CT showing AAA endograft with possible leak  --had endograft done at [facility] in 2017    Indium scan negative      Continue cefazolin  PICC line  IVABX x2-3 weeks, then

## 2019-07-25 NOTE — PLAN OF CARE
Patient maintained on IV antibiotics, tolerated well. No complaints of pain thus far. No nausea and vomiting. Diarrhea improved.  Plan of care to go home with IV antibiotics, patient will have PICC line placement in AM. Patient updated on POC, voiced under Progressing     Problem: MUSCULOSKELETAL - ADULT  Goal: Return mobility to safest level of function  Description  INTERVENTIONS:  - Assess patient stability and activity tolerance for standing, transferring and ambulating w/ or w/o assistive devices  - Ass

## 2019-07-25 NOTE — DIETARY MALNUTRITION NOTE
[facility]    NUTRITION INITIAL ASSESSMENT    Pt meets non-severe, acute malnutrition criteria.     CRITERIA FOR MALNUTRITION DIAGNOSIS:  Criteria for non-severe malnutrition diagnosis: acute illness/injury related to wt loss 7.5% in 3 months and muscl monitor PO and weight changes. Per at home diet recall -   Breakfast: hard boiled egg, gluten free toast, yogurt w/fruit  Lunch: tomato soup, fruit, or pretzels  Dinner: 1/2 sandwich with gluten free bread and deli meat.   Pt reports \"dinner is a probl Intern    Agree with above  Matthieu Everett RD,NEETAN  Pager #[phone]

## 2019-07-25 NOTE — PROGRESS NOTES
CT HOSPITALIST  Progress Note     Winsome Marrow Patient Status:  Inpatient    1940 MRN [id number]   [facility] 4NW-A Attending Tio Orozco, [address] [facility] Day # 4 PCP Kacy Banerjee MD     Chief Complaint: Diarrhea/fever    S: Patient results for input(s): PTP, INR in the last 168 hours. No results for input(s): TROP, CK in the last 168 hours. Imaging: Imaging data reviewed in Epic.     Medications:   • ceFAZolin  1 g Intravenous Q8H   • Normal Saline Flush  10 mL Intravenous

## 2019-07-26 NOTE — PROGRESS NOTES
CT HOSPITALIST  Progress Note     Jack Conrad Patient Status:  Inpatient    1940 MRN [id number]   [facility] 4NW-A Attending Morenita Morales, [address] [facility] Day # 5 PCP Negin Lawson MD     Chief Complaint: Diarrhea/fever    S: Patient in the last 168 hours. Imaging: Imaging data reviewed in Epic.     Medications:   • metoprolol tartrate  25 mg Oral 2x Daily(Beta Blocker)   • ceFAZolin  1 g Intravenous Q8H   • Normal Saline Flush  10 mL Intravenous Q12H   • aspirin EC  81 mg Oral

## 2019-07-26 NOTE — DISCHARGE SUMMARY
[facility] HOSPITALIST  DISCHARGE SUMMARY     Mily Lockett Patient Status:  Inpatient    1940 MRN [id number]   [facility] 4NW-A Attending Brenda Sheriff, [address] [facility] Day # 5 PCP Sylvia Rae MD     Date of Admission: 2019  Date of Dis ceFAZolin Sodium 1 g SOLR 1 g      Inject 1 g into the vein every 12 (twelve) hours for 20 days.    Stop taking on:  8/14/2019  Quantity:  80 Bag  Refills:  0        CONTINUE taking these medications      Instructions Prescription details   aspirin EC 81 MG Quantity:  90 tablet  Refills:  0     tacrolimus 1 MG Caps  Commonly known as:  PROGRAF      Take 1 mg by mouth daily. Refills:  0     tacrolimus 0.5 MG Caps  Commonly known as:  PROGRAF      Take 0.5 mg by mouth every evening.  Takes 1 mg in morning and

## 2019-07-26 NOTE — PROGRESS NOTES
Paged Dr. Alexander Moreno after discharge rounds with  and charge nurse and informed hospitalist of  Cardiovasc.not seen pt yet inspite of paging him for 3-4 days now;has not seen pt yet due to CV MD  in surgery;all day;(called his office this am;per dominik

## 2019-07-26 NOTE — CM/SW NOTE
[facility] placed on [facility] [facility]. RN to call report to [phone] when ready for discharge. LM for daughter Les Mayer regarding discharge. Tricia Schafer RN will call Les Mayer when discharge time confirmed. RN to call report to [phone].     Regina Faulkner MSN

## 2019-07-26 NOTE — PROGRESS NOTES
[facility]  CV Surgery Progress Note    Seymour Rangel Patient Status:  Inpatient    1940 MRN [id number]   [facility] 4NW-A Attending Regina De La Cruz, DO   Hosp Day # 5 PCP Nargis Trivedi MD     Subjective:  Patient is moderately fat

## 2019-07-27 NOTE — PROGRESS NOTES
NURSING DISCHARGE NOTE    Discharged Rehab facility via Ambulance. Accompanied by Support staff  Belongings Taken by patient/family.

## 2019-08-06 NOTE — ED INITIAL ASSESSMENT (HPI)
Patient sent from [facility] OUTPATIENT [facility] for Na 122; daughter at bedside states that over last week patient has had increased fatigue ad decreased motor function. Patient is also reportedly bacteremic and on antibiotics.

## 2019-08-06 NOTE — CONSULTS
[facility]                INFECTIOUS DISEASE PROGRESS NOTE    Jhonangelic Lima Patient Status:  Inpatient    1940 MRN [id number]   [facility] 4NW-A Attending Nikolai Lovett,    Hosp Day # 0 PCP Yoselin Wisdom MD     Antibiotic Hyperparathyroidism due to renal insufficiency (HCC)     Diarrhea of presumed infectious origin     Dehydration     Weakness generalized     Nausea and vomiting in adult     Diarrhea, unspecified type     Kidney transplant status     Drug side effects, ini

## 2019-08-06 NOTE — PLAN OF CARE
PT ADMITTED DROWSY BUT EASILY AROUSABLE, FOLLOWS COMMANDS, ORIENTED, SR, VOIDS PER BEDPAN, URINE SPECIMEN OBTAINED, JENNIFER AREA/GROIN REDDENED, DENIES PAIN, IVF INFUSING, DRESSING TO PICC CHANGED, BLOOD DRAWN FROM PICC, NOTIFIED DR. Hai Woods OF NEW CONSULT, OR

## 2019-08-06 NOTE — PHYSICAL THERAPY NOTE
Order for inpt PT received and chart reviewed. Pt was admitted to the floor at 01:53 this morning. Pt with Na+ = 121 upon presentation to the ED last night. Pt admit with c/o \"dementia like\" symptoms and lethargy.  Pt presenting with symptoms of confusion

## 2019-08-06 NOTE — H&P
[address]    PATIENT'S NAME: Fabio Alvarez   ATTENDING PHYSICIAN: Morteza aGllo M.D.    PATIENT ACCOUNT#:   [de-identified]    LOCATION:  [address] [place] [address]  MEDICAL RECORD #:   [id number]       YOB: 1940  ADMISSION DATE:       08/05/2019 also on timolol eye drops, potassium chloride, pantoprazole, metoprolol, methenamine, magnesium oxide, hydralazine, latanoprost eye drops, glimepiride, iron sulfate. ALLERGIES:  Vasotec, amlodipine, atorvastatin, and Plavix.     FAMILY HISTORY:  Noncont prophylaxis. Subcutaneous heparin. 9.   Type 2 diabetes with complication. We will have to hold oral medications while in the hospital.  Monitor oral intake and will use sliding scale NovoLog low dose. 10.   Disposition per .    11     CT s

## 2019-08-06 NOTE — DIETARY MALNUTRITION NOTE
[facility]    NUTRITION INITIAL ASSESSMENT    Pt meets non-severe, acute malnutrition criteria.     CRITERIA FOR MALNUTRITION DIAGNOSIS:  Criteria for severe malnutrition diagnosis: chronic illness related to wt loss greater than 7.5% in 3 months and e 48.5 kg (107 lb)  07/24/19 : 53.8 kg (118 lb 9.6 oz)  07/15/19 : 51.3 kg (113 lb)  06/27/19 : 51.7 kg (114 lb)  06/13/19 : 52.4 kg (115 lb 9.6 oz)  06/10/19 : 53.1 kg (117 lb)  05/30/19 : 51.4 kg (113 lb 5.1 oz)  05/30/19 : 51.4 kg (113 lb 4 oz)  05/15/19

## 2019-08-06 NOTE — CONSULTS
[facility]    Report of Consultation    Padmini Toscano Patient Status:  Inpatient    1940 MRN [id number]   [facility] 2NE-A Attending Kasey Cordoba MD   Hosp Day # 0 PCP Inez Cisneros MD     Date of Admission:  2019  Date Unspecified essential hypertension    • Vomiting    • Wears glasses    • Weight loss      Past Surgical History:   Procedure Laterality Date   • CATARACT     • CATH PERCUTANEOUS  TRANSLUMINAL CORONARY ANGIOPLASTY     • COLONOSCOPY  6/2013 fall 2017    r (PROGRAF) cap 1 mg, 1 mg, Oral, Daily  •  aspirin EC tab 81 mg, 81 mg, Oral, Daily  •  latanoprost (XALATAN) 0.005 % ophthalmic solution 1 drop, 1 drop, Both Eyes, Nightly  •  Methenamine Hippurate (HIPREX) tab 1 g, 1 g, Oral, BID  •  Pantoprazole Sodium ( rate and rhythm. NEUROLOGICAL:  This patient is alert and orientated to month, not year or hospital.  Speech fluent. Able to follow only very simple commands. Face is symmetrical.  No Drift. Pupils equally round and reactive to light.   2+ reflexes bilat ([facility] [address]     Anemia of chronic renal failure, stage 3 (moderate) (HCC)     Severe protein-calorie malnutrition (HCC)     Anemia     Azotemia     Hyperglycemia     Hyponatremia     Hypervolemia, unspecified hypervolemia type     Chronic diarrhea     Kidney tra

## 2019-08-06 NOTE — CONSULTS
[facility]  Report of Consultation    Karine Spikes Patient Status:  Inpatient    1940 MRN [id number]   [facility] 2NE-A Attending Shweta Sosa MD   Hosp Day # 0 PCP Morteza Gallo MD     Reason for Consultation:  S/p kidney t disturbance    • Stented coronary artery    • Unspecified essential hypertension    • Vomiting    • Wears glasses    • Weight loss      Past Surgical History:   Procedure Laterality Date   • CATARACT     • CATH PERCUTANEOUS  TRANSLUMINAL CORONARY ANGIOPLAS Q6 Months  •  OCUVITE-LUTEIN (OCUVITE - EYE VITAMIN) tab 1 tablet, 1 tablet, Oral, BID  •  tacrolimus (PROGRAF) cap 1 mg, 1 mg, Oral, Daily  •  aspirin EC tab 81 mg, 81 mg, Oral, Daily  •  latanoprost (XALATAN) 0.005 % ophthalmic solution 1 drop, 1 drop, B dry mucous membranes. EOM-I. PERRL  Neck: No lymphadenopathy. No JVD. No carotid bruits. Respiratory: Clear to auscultation bilaterally. No wheezes. No rhonchi. Cardiovascular: S1, S2.  Regular rate and rhythm. No murmurs.  Equal pulses   Abdomen: Soft labs  2. Hyponatremia - suspect due to combination of low solute intake + ? SIADH  Urine chem w/ Madelyn ~ 120  - will continue saline + small dose of IV lasix today  - repeat BMP this afternoon  3.  Uncontrolled HTN- continue BB: will add hydralazine 25 tid +

## 2019-08-06 NOTE — ED PROVIDER NOTES
Patient Seen in: [facility] Emergency Department    History   Patient presents with:  Abnormal Labs    Stated Complaint:     HPI    Patient is a 13-year-old woman sent in for abnormal labs.   She has a history of kidney transplant on immunosuppressiv • CATH PERCUTANEOUS  TRANSLUMINAL CORONARY ANGIOPLASTY     • COLONOSCOPY  6/2013 fall 2017    rosalio 3 yrs  [employer] [employer]   • COLONOSCOPY     • ENDOVAS AAA REPR W/3-P PART     • ENDOVAS REPAIR, INFRARENL ABDOM AORTIC ANEURYSM/DISSECT     • [facility] Normal rate and intact distal pulses. Pulmonary/Chest: Effort normal. No respiratory distress. Abdominal: Soft. She exhibits no distension. There is no tenderness. Musculoskeletal: Normal range of motion. She exhibits no tenderness.    Right upper ext -----------         ------                     CBC W/ DIFFERENTIAL[[id number]]          Abnormal            Final result                 Please view results for these tests on the individual orders.    COMP METABOLIC PANEL (14)   OSMOLALITY, URINE   SOD

## 2019-08-06 NOTE — OCCUPATIONAL THERAPY NOTE
Received order for OT evaluation. Admitted for low sodium level and \"dementia like\" symptoms. Will continue to follow.

## 2019-08-06 NOTE — PLAN OF CARE
Assumed care @0730  VSS, A&Ox2, disoriented to place and time with drowsiness and fatigue. RA  NSR , Denies Pain, Pt is usually up as tolerated, but at rehab for increased weakness. PT/OT will see tomrrow. Low po intake. Dietician ordered magic cups. for signs and symptoms of volume excess or deficit  - Monitor intake, output and patient weight  - Monitor urine specific gravity, serum osmolarity and serum sodium as indicated or ordered  - Monitor response to interventions for patient's volume status, i

## 2019-08-07 NOTE — PROGRESS NOTES
[address] [facility] Progress Note    Andrea Rashid Patient Status:  Inpatient    1940 MRN [id number]   [facility] 2NE-A Attending Shani Connors MD   Hosp Day # 1 PCP Alan Ware MD         Subjective:  Andrea Rashid tacrolimus  1 mg Oral Daily   • aspirin EC  81 mg Oral Daily   • latanoprost  1 drop Both Eyes Nightly   • Methenamine Hippurate  1 g Oral BID   • Pantoprazole Sodium  40 mg Oral QAM AC   • metoprolol Tartrate  25 mg Oral 2x Daily(Beta Blocker)   • Pravast inappropriate ADH production) ([facility] [address]     Uncontrolled hypertension    Assessment/Plan:    Recent bacteremia  Hyponatremia  Toxic metabolic encephalopathy secondary to above  Hypertension     Diagnostics/Imaging    HCT no acute process noted, stable chronic

## 2019-08-07 NOTE — PROGRESS NOTES
[facility]  Progress Note    Becky Garcia Patient Status:  Inpatient    1940 MRN [id number]   [facility] 2NE-A Attending Yakelin Ricks MD   Hosp Day # 1 PCP Dianne Bunch MD         SUBJECTIVE:  Subjective:  Becky Garcia i --  1.6   GLU 89 87  87 92 94 87       Recent Labs   Lab 08/05/19  2136 08/06/19  0525 08/07/19  0448   ALT 9* 6* 6*   AST 9* 8* 8*   ALB 2.8* 2.7* 2.8*       Recent Labs   Lab 08/06/19  1243 08/06/19  1625 08/06/19  2122 08/07/19  0745   PGLU 121* 106* 93 Gastroesophageal reflux disease. Continue pantoprazole. 6.       Dyslipidemia, increased cholesterol. 7.       History of kidney transplant secondary to history of end-stage renal disease secondary to polycystic kidney disease.   Currently on Imuran

## 2019-08-07 NOTE — OCCUPATIONAL THERAPY NOTE
OCCUPATIONAL THERAPY EVALUATION - INPATIENT     Room Number: 2621/2621-A  Evaluation Date: 8/7/2019  Type of Evaluation: Initial  Presenting Problem: (TME due to bacteremia and hyponatremia)    Physician Order: IP Consult to Occupational Therapy  Reason fo Sleep disturbance    • Stented coronary artery    • Unspecified essential hypertension    • Vomiting    • Wears glasses    • Weight loss        Past Surgical History  Past Surgical History:   Procedure Laterality Date   • CATARACT     • CATH PERCUTANEOUS Risk: High fall risk    WEIGHT BEARING RESTRICTION  Weight Bearing Restriction: None                PAIN ASSESSMENT  Rating: Unable to rate  Location: (LLQ pain, resolved after transfer to chair)  Management Techniques: Relaxation;Repositioning    COGNITIO note.  Patient required frequent cues to maintain alertness, eyes closed often. Patient /93. Patient required max A of one and mod A of another to stand and transfer to bedside chair with moderate cues. Patient fatigued afterward, needed rest break. Research supports that patients with this level of impairment often benefit from SNF/IRF. Subacute rehab is recommended for 11-14 days.   After this period of rehabilitation patient should achieve min assist level in ADLs,      Patient Complexity  Occupati

## 2019-08-07 NOTE — PLAN OF CARE
Patient is A&O to self. Unable to states location, time, and situation. Patient states having no pain at this time. Briefed, incontinent of bladder. Camila area red. Q 2 hour turn. Ivf infusing to R PICC. Left arm precaution for old AV fistula.  BP on right l

## 2019-08-07 NOTE — PLAN OF CARE
Assumed care of patient @ 1581. A/OX1-2, drowsy, self, poor appetite, pt complains of stomach ache when eating, Dr Janine Oliveira aware. SR/SA on tele, no CP.  Pt's daughter Shayla Chávez @ bedside, does not want feeding tube for patient, palliative care consulted, will see

## 2019-08-07 NOTE — PROGRESS NOTES
[facility]                INFECTIOUS DISEASE PROGRESS NOTE    Tura Pelt Patient Status:  Inpatient    1940 MRN [id number]   [facility] 4NW-A Attending Shani Montoya, [address] 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 [address]     Hyperparathyroidism due to renal insufficiency (H

## 2019-08-07 NOTE — PHYSICAL THERAPY NOTE
PHYSICAL THERAPY EVALUATION - INPATIENT     Room Number: 2621/2621-A  Evaluation Date: 8/7/2019  Type of Evaluation: Initial  Physician Order: PT Eval and Treat    Presenting Problem: Hyponatremia  Reason for Therapy: Mobility Dysfunction and Discharge P of rheumatic fever    • Indigestion    • Irregular bowel habits    • Kidney failure    • Nausea    • Night sweats    • Osteoarthritis    • Osteoporosis    • Other and unspecified hyperlipidemia    • Polycystic kidney disease    • [facility]    • R distance ambulation. However, the pt's dtr, Nubia, at bedside reports that the last few days the pt had been unable to ambulate.     SUBJECTIVE  Pt lethargic, drowsy and minimally verbal.      OBJECTIVE  Precautions: Cardiac;Limb alert - right;Limb alert - wheelchair, bedside commode, etc.): A Lot   -   Moving from lying on back to sitting on the side of the bed?: A Lot   How much help from another person does the patient currently need. ..   -   Moving to and from a bed to a chair (including a wheelchair)?: toxic metabolic encephalopathy, ESRD s/p renal transplant.   In this PT evaluation, the patient presents with the following impairments: 1) impaired cognition, 2) impaired muscular endurance, 3) impaired cardiopulmonary endurance, 4) impaired static and dyn

## 2019-08-07 NOTE — PROGRESS NOTES
[facility]  Progress Note    Murtis Deal Patient Status:  Inpatient    1940 MRN [id number]   [facility] 2NE-A Attending Mariza Lincoln MD   Hosp Day # 1 PCP Fredo Ladd MD     No acute issues overnight  Denies any n/v/sob Blood pressure (!) 170/75, pulse 82, temperature 98.6 °F (37 °C), temperature source Oral, resp. rate 16, height 63\", weight 106 lb 7.7 oz, SpO2 94 %. General: No acute distress.  Alert and oriented to person/time/ thinks she is in [place]  HEENT: radha allowing me to participate in this patient's care. Please feel free to call me with any questions or concerns.     Baltazar Seip, MD  8/7/2019

## 2019-08-08 PROBLEM — Z51.5 PALLIATIVE CARE ENCOUNTER: Status: ACTIVE | Noted: 2019-01-01

## 2019-08-08 PROBLEM — Z71.89 GOALS OF CARE, COUNSELING/DISCUSSION: Status: ACTIVE | Noted: 2019-01-01

## 2019-08-08 NOTE — CONSULTS
[address]   [id number]  Hospital Day #2  Date of Consult:  8/7/2019      Reason for Consultation:      Consult requested by Dr. Marissa Pearson for evaluation of palliative care needs,  Goals of care. removed 1994   • Diverticulosis of large intestine    • Dizziness    • Easy bruising    • Facial basal cell cancer    • Flatulence/gas pain/belching    • Frequent UTI    • Glaucoma     left   • Heartburn    • High blood pressure    • High cholesterol    • Patient Live Alone: no  Is Patient Confused: yes  Occupational History:     Yarsanism/Cultural Information:  Yarsanism Affiliation: Religion    Functional Status: Prior to the onset of her infections and diarrhea, repeat hospitalizations she was 5,000 Units, Subcutaneous, Q12H  •  ondansetron HCl (ZOFRAN) injection 4 mg, 4 mg, Intravenous, Q6H PRN  •  acetaminophen (TYLENOL) tab 650 mg, 650 mg, Oral, Q6H PRN  •  0.9% NaCl infusion, , Intravenous, Continuous  •  brimonidine Tartrate (ALPHAGAN) 0.2 discomfort    Objective/Physical Exam:     Vital Signs: BP (!) 164/74 (BP Location: Right leg)   Pulse 72   Temp 98.1 °F (36.7 °C) (Oral)   Resp 16   Ht 5' 3\" (1.6 m)   Wt 106 lb 0.7 oz (48.1 kg)   SpO2 94%   BMI 18.78 kg/m²     General: drowsy, appears f consultation. I provided education on the differences between palliative care and hospice care.  I discussed the benefits of palliative care to include assistance with arising symptom management needs, extra layer of support, facilitate [facility] palliative care support throughout her progression. Advance Care Planning counseling and discussion:  Patient's preference about sharing medical information: Colleen Grimaldo [phone], her daughter.    Patient's decision making preferences: herself and

## 2019-08-08 NOTE — PROGRESS NOTES
[facility]  Progress Note    Servando Dupree Patient Status:  Inpatient    1940 MRN [id number]   [facility] 2NE-A Attending Renea Hemphill MD   Hosp Day # 2 PCP Kumar Card MD     No acute issues overnight         Current Facil Oral, resp. rate 18, height 63\", weight 106 lb 0.7 oz, SpO2 95 %. General: No acute distress. Alert and oriented to person/time/ thinks she is in [place]  HEENT: moist mucous membranes. EOM-I. PERRL  Neck: No lymphadenopathy. No JVD.  No carotid brui in past  6. DM    Thank you for allowing me to participate in this patient's care. Please feel free to call me with any questions or concerns.     Ja Duong MD  8/8/2019

## 2019-08-08 NOTE — PROGRESS NOTES
[facility]  Progress Note    Roxane Quiroz Patient Status:  Inpatient    1940 MRN [id number]   [facility] 2NE-A Attending Mattie Ross MD   Hosp Day # 2 PCP Nasra Schreiber MD         SUBJECTIVE:  Subjective:  Roxane Quiroz i --   --  1.6  --   --    GLU 89   < > 92 94 87 169* 108*    < > = values in this interval not displayed.        Recent Labs   Lab 08/05/19  2136 08/06/19  0525 08/07/19  0448   ALT 9* 6* 6*   AST 9* 8* 8*   ALB 2.8* 2.7* 2.8*       Recent Labs   Lab 08/07/1 fluids / Lasix. 3.       Hypertension. Continue metoprolol and hydralazine. 4.       Coronary artery disease. Continue aspirin, metoprolol, and pravastatin. 5.       Gastroesophageal reflux disease. Continue pantoprazole.    6.       Dyslipidemia, inc

## 2019-08-08 NOTE — CM/SW NOTE
08/08/19 1600   CM/SW Referral Data   Referral Source Social Work (self-referral)   Reason for Referral Discharge planning   Patient Dickson Mina Name [address] [facility] for pt to have [address] [place] [address]

## 2019-08-08 NOTE — PLAN OF CARE
Patient is A&O to self. Patient states having no pain at this time but is very tired. NSR/Sinus Arrhythmia on tele. Briefed, incontinent at times of bladder and bowel. Will occasionally ask for bedpan. Camila area red. Q 2 hour turn.  Family at bedside, patie

## 2019-08-08 NOTE — PLAN OF CARE
Received pt this morning, A&O X2, cooperative and more awake this morning with improving appetite, Bw=839, up in chair per sit to stand, voiding freely.  Poc updated, palliative meet with pt daughter, made her DNR, cont IVF/ IV Abx, BMP q 8hrs as schedule, indicated or ordered  - Monitor response to interventions for patient's volume status, including labs, urine output, blood pressure (other measures as available)  - Encourage oral intake as appropriate  - Instruct patient on fluid and nutrition restriction

## 2019-08-09 PROCEDURE — 99222 1ST HOSP IP/OBS MODERATE 55: CPT | Performed by: INTERNAL MEDICINE

## 2019-08-09 NOTE — PLAN OF CARE
Assumed care of patient @ 0730, A/OX2, lethargic, will answer to name but easily falls asleep. SR/SA on tele. Room air, non-productive cough. Pt can transfer from bed to chair with assist x2 or sit to stand.  Has had slight improvement in appetite, still co

## 2019-08-09 NOTE — PROGRESS NOTES
[facility]  Progress Note    Tremayne Huston Patient Status:  Inpatient    1940 MRN [id number]   [facility] 2NE-A Attending Alicja Wynne MD   Hosp Day # 3 PCP Duncan Grant MD         SUBJECTIVE:  Subjective:  Tremayne Huston i > 8.0* 8.0* 8.3* 8.5 8.5   MG 1.7  --  1.6  --   --   --   --    GLU 89   < > 87 169* 108* 174* 127*    < > = values in this interval not displayed. Recent Labs   Lab 08/05/19  2136 08/06/19  0525 08/07/19  0448   ALT 9* 6* 6*   AST 9* 8* 8*   ALB 2. Correction of electrolytes per Renal.  IV fluids / Lasix. 3.       Hypertension. Continue metoprolol and hydralazine. 4.       Coronary artery disease. Continue aspirin, metoprolol, and pravastatin. 5.       Gastroesophageal reflux disease.   Continue

## 2019-08-09 NOTE — CONSULTS
Gastroenterology Initial Consultation    Mily Lockett Patient Status:  Inpatient    1940 MRN [id number]   [facility] 2NE-A Attending Dominguez Valentine MD   Hosp Day # 3 PCP Anette Francisco MD       Reason for Consultation/Chief Comp artery    • Unspecified essential hypertension    • Vomiting    • Wears glasses    • Weight loss      Past Surgical History:   Procedure Laterality Date   • CATARACT     • CATH PERCUTANEOUS  TRANSLUMINAL CORONARY ANGIOPLASTY     • COLONOSCOPY  6/2013    fa TID  •  hydrALAzine HCl (APRESOLINE) tab 50 mg, 50 mg, Oral, Q8H ROMY  •  predniSONE (DELTASONE) tab 5 mg, 5 mg, Oral, Daily  •  OCUVITE-LUTEIN (OCUVITE - EYE VITAMIN) tab 1 tablet, 1 tablet, Oral, BID  •  tacrolimus (PROGRAF) cap 1 mg, 1 mg, Oral, Daily  • appearance of nose and ears.  Normal appearance of lips, teeth, gums and oral mucosa  Neck: Normal neck inspection, normal thyroid palpation   Respiratory: Normal respiratory effort, normal breath sounds on bilateral auscultation  Cardiovascular: Normal car

## 2019-08-09 NOTE — PROGRESS NOTES
[facility]  Progress Note    Seymour Rangel Patient Status:  Inpatient    1940 MRN [id number]   [facility] 2NE-A Attending Dasha Carballo MD   Hosp Day # 3 PCP Whitney Luna MD     No acute issues overnight         Current Facil Oral, resp. rate 18, height 63\", weight 108 lb 0.4 oz, SpO2 96 %. General: No acute distress. Alert and oriented to person/time/ thinks she is in [place]  HEENT: moist mucous membranes. EOM-I. PERRL  Neck: No lymphadenopathy. No JVD.  No carotid brui

## 2019-08-09 NOTE — DIETARY MALNUTRITION NOTE
[facility]    NUTRITION INITIAL ASSESSMENT    Pt meets moderate malnutrition criteria.     CRITERIA FOR MALNUTRITION DIAGNOSIS:  Criteria for non-severe malnutrition diagnosis: chronic illness related to wt loss 7.5% in 3 months, energy intake less autumn 122. Pt with E coli bacteremia. Pt has had ongoing diarrhea for the past 5 months leading n/v and decreased appetite/intake and significant wt loss of 24 # over the past 5 months. The diarrhea resolved about 2 weeks ago per pt's daughter.   Pt has been in or per MD discretion    MONITOR AND EVALUATE/NUTRITION GOALS:    1. PO intake to meet at least 75% patient nutrition prescription  2. At least 75% intake of oral supplements  3. No signs of skin breakdown  4.  Maintain lean body mass    MEDICATIONS:  Progra

## 2019-08-09 NOTE — PLAN OF CARE
Assumed care of pt @2330. Pt axox2-3. Denies chest pain or sob. Sinus rhythm with pac's and pvc;s on tele. Lungs clear/diminished. O2 sat on RA 94%. Pt's abdomen distended, firm, tender. BS present x 4 quadrants. Pt denies nausea.  Had a small bowel movemen

## 2019-08-09 NOTE — OCCUPATIONAL THERAPY NOTE
OCCUPATIONAL THERAPY TREATMENT NOTE - INPATIENT     Room Number: 2621/2621-A  Session: 1   Number of Visits to Meet Established Goals: 5    Presenting Problem: (TME due to bacteremia and hyponatremia)    History related to current admission: Admitted from artery    • Unspecified essential hypertension    • Vomiting    • Wears glasses    • Weight loss        Past Surgical History  Past Surgical History:   Procedure Laterality Date   • CATARACT     • CATH PERCUTANEOUS  TRANSLUMINAL CORONARY ANGIOPLASTY     • teeth?: A Little  -   Eating meals?: A Little    AM-PAC Score:  Score: 14  Approx Degree of Impairment: 59.67%  Standardized Score (AM-PAC Scale): 33.39  CMS Modifier (G-Code): CK    FUNCTIONAL TRANSFER ASSESSMENT  Supine to Sit : Moderate assistance  Sit Patient will perform grooming: with min assist and while in chair  Patient will perform toileting: with mod assist and with bedside commode     Functional Transfer Goals  Patient will transfer to bedside commode:  with mod assist     UE Exercise Program

## 2019-08-09 NOTE — PLAN OF CARE
Problem: GENITOURINARY - ADULT  Goal: Absence of urinary retention  Description  INTERVENTIONS:  - Assess patient’s ability to void and empty bladder  - Monitor intake/output and perform bladder scan as needed  - Follow urinary retention protocol/standar

## 2019-08-09 NOTE — PROGRESS NOTES
[facility]                INFECTIOUS DISEASE PROGRESS NOTE    Karon Soriano Patient Status:  Inpatient    1940 MRN [id number]   [facility] 4NW-A Attending Edyta Salmeron, [address] Day # 3 PCP Chadd Narayanan MD     Antibiotic positive for sapovirus     Diarrhea of infectious origin     Bacteremia     SIADH (syndrome of inappropriate ADH production) ([address]     Uncontrolled hypertension     Goals of care, counseling/discussion     Palliative care encounter      ASSESSMENT/PLAN:  1

## 2019-08-09 NOTE — PLAN OF CARE
Patient is A&O to self. Patient states having abdominal pain that has been recurrent today, declined pain medication. Denies nausea & diarrhea. Abdomen is firm, rounded, distended. BS present x 4 quadrants. NSR/Sinus Arrhythmia on tele.  Will occasionally a

## 2019-08-09 NOTE — CONSULTS
[facility]  Cardiology Consultation    Leticia Lima Patient Status:  Inpatient    1940 MRN [id number]   [facility] 2NE-A Attending Eric Calvillo MD   Hosp Day # 3 PCP Yoselin Wisdom MD     Reason for Consultation:  AAA aneurys • Wears glasses    • Weight loss      Past Surgical History:   Procedure Laterality Date   • CATARACT     • CATH PERCUTANEOUS  TRANSLUMINAL CORONARY ANGIOPLASTY     • COLONOSCOPY  6/2013 fall 2017    rosalio 3 yrs  [facility]   • COLONOSCOPY     • [address]  •  predniSONE (DELTASONE) tab 5 mg, 5 mg, Oral, Daily  •  OCUVITE-LUTEIN (OCUVITE - EYE VITAMIN) tab 1 tablet, 1 tablet, Oral, BID  •  tacrolimus (PROGRAF) cap 1 mg, 1 mg, Oral, Daily  •  aspirin EC tab 81 mg, 81 mg, Oral, Daily  •  latanoprost (XALAT (38.2 °C)       Intake/Output Summary (Last 24 hours) at 8/9/2019 1621  Last data filed at 8/9/2019 0611  Gross per 24 hour   Intake 960 ml   Output 500 ml   Net 460 ml     Wt Readings from Last 3 Encounters:  08/09/19 : 108 lb 0.4 oz (49 kg)  07/24/19 : 1

## 2019-08-09 NOTE — PROGRESS NOTES
[address] [facility] Follow Up     Quintin Majano  [id number]  Hospital Day #3  Date of Consult: 08/08/19  Patient seen at: [facility]     Subjective:      Patient was seen mg, 50 mg, Oral, Daily  •  ferrous sulfate EC tab 325 mg, 325 mg, Oral, Daily with breakfast  •  hydrALAzine HCl (APRESOLINE) injection 10 mg, 10 mg, Intravenous, Q6H PRN  •  diphenhydrAMINE HCl (BENADRYL) injection 25 mg, 25 mg, Intravenous, Q6H PRN  •  H Objective/Physical Exam:     Vital Signs: /74 (BP Location: Right leg)   Pulse 89   Temp 97.9 °F (36.6 °C) (Oral)   Resp 18   Ht 5' 3\" (1.6 m)   Wt 108 lb 0.4 oz (49 kg)   SpO2 96%   BMI 19.14 kg/m²     General: Appears frail and drowsy  Body nutrition    HCPOA:  Healthcare Agent Appointed: Yes  Healthcare Agent's Name: [address] [place] [address] Agent's Phone Number: [phone]        Disposition: SNF palliative care      Problem List       Hyponatremia    SIADH (syndrome of inappropriate ADH

## 2019-08-10 PROCEDURE — 99232 SBSQ HOSP IP/OBS MODERATE 35: CPT | Performed by: CLINICAL NURSE SPECIALIST

## 2019-08-10 NOTE — PROGRESS NOTES
[facility]  Progress Note    Chuyita Miller Patient Status:  Inpatient    1940 MRN [id number]   [facility] 2NE-A Attending Bernarda Durand MD   Hosp Day # 4 PCP Jeffrey Teresa MD     No acute issues overnight         Current Facil Subcutaneous TID PC       Physical Exam:  Vital signs: Blood pressure 140/64, pulse 64, temperature 98 °F (36.7 °C), temperature source Oral, resp. rate 20, height 63\", weight 113 lb 12.1 oz, SpO2 96 %. General: No acute distress.  Alert and oriented to p

## 2019-08-10 NOTE — PLAN OF CARE
Assumed care of patient 8/9/19 1930. From [facility] here w/hyponatremia and continued treatment for recent bacteremia infection. Pt requests to be DNR/DNI.      Upon assessment patient is somnolent, will respond to voice but unable to keep eyes open appropriate  - Fluid restriction as ordered  - Instruct patient on fluid and nutrition restrictions as appropriate  Outcome: Progressing  Goal: Hemodynamic stability and optimal renal function maintained  Description  INTERVENTIONS:  - Monitor labs and ass performing ADLs such as feeding, grooming, and bathing  - Educate and encourage patient/family in tolerated functional activity level and precautions during self-care     Outcome: Progressing

## 2019-08-10 NOTE — PROGRESS NOTES
[facility] Cardiology Progress Note    Subjective:  A little back pain earlier, no complaints now, slept well.      Objective:  /72 (BP Location: Right leg)   Pulse 69   Temp 98.1 °F (36.7 °C) (Axillary)   Resp 18   Ht 160 cm (5' 3\")   Wt 113 lb 12.1 oz APRN  8/10/2019  7:52 AM

## 2019-08-10 NOTE — PLAN OF CARE
Nephrology here to see patient, Plan: one dose of Tolvaptan, Hyponatremia improving , continue normal saline at 75 cc per.  Continue to monitor Na Levels

## 2019-08-10 NOTE — PROGRESS NOTES
Gastroenterology Follow-Up Note      Karine Spikes Patient Status:  Inpatient    1940 MRN [id number]   [facility] 2NE-A Attending Shweta Sosa MD   Hosp Day # 4 PCP Morteza Gallo MD     Chief Complaint/Reason for Follow Up:

## 2019-08-10 NOTE — PLAN OF CARE
Problem: Diabetes/Glucose Control  Type II diabetes  Insulin coverage per sliding scale  IV fluids infusing at 75 cc per hour  NPO , with exception of medication, tolerating well.      Goal: Glucose maintained within prescribed range  stable  Description patient weight  - Monitor urine specific gravity, serum osmolarity and serum sodium as indicated or ordered  - Monitor response to interventions for patient's volume status, including labs, urine output, blood pressure (other measures as available)  Maria D Bear Progressing

## 2019-08-10 NOTE — PHYSICAL THERAPY NOTE
Chart reviewed, attempted to see pt for skilled PT services, however, pt out of room for a CT as per RN, will follow up as appropriate.

## 2019-08-10 NOTE — PROGRESS NOTES
[facility]  Progress Note    Servando Dupree Patient Status:  Inpatient    1940 MRN [id number]   [facility] 2NE-A Attending Renea Hemphill MD   Hosp Day # 4 PCP Kumar Card MD         SUBJECTIVE:  Subjective:  Servando Dupree i 16   CREATSERUM 0.50*   < > 0.48*   < > 0.61 0.56 0.56 0.54* 0.58   CA 8.6   < > 8.0*   < > 8.5 8.7 8.5 7.9* 8.1*   MG 1.7  --  1.6  --   --  1.7  --   --  2.0   GLU 89   < > 87   < > 127* 133* 114* 105* 102*    < > = values in this interval not displayed. possibly metabolic encephalopathy, for underlying medical problems and electrolyte abnormality. Rule out acute CVA. Shae Drake Neuro  consulted. Correct electrolytes per Renal.    2.       Hyponatremia. Correction of electrolytes per Renal.  IV fluids / Lasix.

## 2019-08-10 NOTE — PLAN OF CARE
CT of abdomen results received, Called to Dr. Gianni Hanson, and Dr. Jenifer Wolf covering for Dr. Judy Inman. Large amount of urine noted in bladder on scan.  Patient brief saturated, beth catheter order received, placed without difficulty, 1100 cc clear yellow urine retu

## 2019-08-11 NOTE — PLAN OF CARE
Problem: Diabetes/Glucose Control  Goal: Glucose maintained within prescribed range  Description  INTERVENTIONS:  - Monitor Blood Glucose as ordered  - Assess for signs and symptoms of hyperglycemia and hypoglycemia  - Administer ordered medications to m appropriate  Outcome: Progressing     Problem: MUSCULOSKELETAL - ADULT  Goal: Return mobility to safest level of function  Description  INTERVENTIONS:  - Assess patient stability and activity tolerance for standing, transferring and ambulating w/ or w/o as

## 2019-08-11 NOTE — PROGRESS NOTES
Gastroenterology Follow-Up Note      Blenda Givens Patient Status:  Inpatient    1940 MRN [id number]   [facility] 2NE-A Attending Elan Cuba MD   Hosp Day # 5 PCP Lorna Rodríguez MD     Chief Complaint/Reason for Follow Up: sign off, please call with questions.       Elan Rodriguez  Gastroenterology/[facility]

## 2019-08-11 NOTE — PLAN OF CARE
Problem: Diabetes/Glucose Control  Type II diabetes  Accu-checks QID, Insulin coverage per sliding scale  No symptoms of hypoglycemia  Diet order from Dr. Adi Earl for full liquid diet, tolerating thin liquids and medications with apples sauce  Goal: Glu Hemodynamic stability and optimal renal function maintained  Creatinine stable,  Description  INTERVENTIONS:  - Monitor labs and assess for signs and symptoms of volume excess or deficit  - Monitor intake, output and patient weight  - Monitor urine specifi sitting position while performing ADLs such as feeding, grooming, and bathing  - Educate and encourage patient/family in tolerated functional activity level and precautions during self-care     Outcome: Progressing

## 2019-08-11 NOTE — PROGRESS NOTES
[facility]  Progress Note    Murtis Deal Patient Status:  Inpatient    1940 MRN [id number]   [facility] 2NE-A Attending Mariza Lincoln MD   Hosp Day # 5 PCP Fredo Ladd MD         SUBJECTIVE:  Subjective:  Murtis Deal i some perirectal edema, the combination of findings suggest stercoral colitis   /loops of bowel are displaced out of the pelvis due to they a markedly distended bladder. There is uncomplicated diverticulosis of the sigmoid and left colon. proctitis.   ABDO thyroid:      no JVD   Lungs:     Clear to auscultation bilaterally, respirations unlabored       Heart:    Regular rate and rhythm, S1 and S2 normal,     Abdomen:     Soft, non-tender, bowel sounds active all four quadrants,                Extremities: Daily   • aspirin EC  81 mg Oral Daily   • latanoprost  1 drop Both Eyes Nightly   • Methenamine Hippurate  1 g Oral BID   • Pantoprazole Sodium  40 mg Oral QAM AC   • metoprolol Tartrate  25 mg Oral 2x Daily(Beta Blocker)   • Pravastatin Sodium  20 mg Ora complication. We will have to hold oral medications while in the hospital.  Monitor oral intake and will use sliding scale NovoLog low dose. 10.     Disposition per .      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

## 2019-08-11 NOTE — PROGRESS NOTES
[facility]                INFECTIOUS DISEASE PROGRESS NOTE    Becky Garcia Patient Status:  Inpatient    1940 MRN [id number]   [facility] 4NW-A Attending Lazarus Handy, [address] [facility] Day # 5 PCP Dianne Bunch MD     Antibiotic Nucleic acid assay by PCR positive for sapovirus     Diarrhea of infectious origin     Bacteremia     SIADH (syndrome of inappropriate ADH production) ([facility]     Uncontrolled hypertension     Goals of care, counseling/discussion     Palliative care encounte

## 2019-08-11 NOTE — PROGRESS NOTES
[facility]  Progress Note    Murtis Deal Patient Status:  Inpatient    1940 MRN [id number]   [facility] 2NE-A Attending Mariza Lincoln MD   Hosp Day # 5 PCP Fredo Ladd MD     No acute issues overnight  Patient somnolent ; UNIT/ML flexpen 1-5 Units 1-5 Units Subcutaneous TID PC       Physical Exam:  Vital signs: Blood pressure 128/62, pulse 84, temperature 97.8 °F (36.6 °C), temperature source Oral, resp. rate 16, height 63\", weight 106 lb 14.8 oz, SpO2 95 %.   General: No a in past  6. DM    Thank you for allowing me to participate in this patient's care. Please feel free to call me with any questions or concerns.     Fidencio Carroll MD  8/11/2019

## 2019-08-12 NOTE — PHYSICAL THERAPY NOTE
Attempted to see pt. Chart reviewed. 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  Chart review also reveals palliative care note with medical recommendations and discussions

## 2019-08-12 NOTE — PROGRESS NOTES
[facility]                INFECTIOUS DISEASE PROGRESS NOTE    Marilyn Santoyo Patient Status:  Inpatient    1940 MRN [id number]   [facility] 4NW-A Attending Claire Mcgarry, [address] [facility] Day # 6 PCP Lennox Ahr, MD     Antibiotic unspecified type     Kidney transplant status     Drug side effects, initial encounter     Pseudoaneurysm of brachial artery (HCC)     Anemia of chronic renal failure, stage 3 (moderate) (HCC)     Severe protein-calorie malnutrition ([facility] [address]     Anemia     Az

## 2019-08-12 NOTE — PROGRESS NOTES
[facility]  Progress Note    Suleiman Galdamez Patient Status:  Inpatient    1940 MRN [id number]   [facility] 2NE-A Attending Asher Jeffrey MD   Hosp Day # 6 PCP Magui Sahni MD         SUBJECTIVE:  Subjective:  Suleiman Galdamez i the combination of findings suggest stercoral colitis   /loops of bowel are displaced out of the pelvis due to they a markedly distended bladder. There is uncomplicated diverticulosis of the sigmoid and left colon. proctitis.   ABDOMINAL WALL:  No mass or Clear to auscultation bilaterally, respirations unlabored       Heart:    Regular rate and rhythm, S1 and S2 normal,     Abdomen:     Soft, non-tender, bowel sounds active all four quadrants,                Extremities:    no cyanosis, icterus Oral Daily   • aspirin EC  81 mg Oral Daily   • latanoprost  1 drop Both Eyes Nightly   • Methenamine Hippurate  1 g Oral BID   • Pantoprazole Sodium  40 mg Oral QAM AC   • metoprolol Tartrate  25 mg Oral 2x Daily(Beta Blocker)   • Pravastatin Sodium  20 m heparin. 9.       Type 2 diabetes with complication. We will have to hold oral medications while in the hospital.  Monitor oral intake and will use sliding scale NovoLog low dose. 10.     Disposition per .      11      CT showing AAA e

## 2019-08-12 NOTE — PROGRESS NOTES
[facility]  Progress Note    Padmini Toscano Patient Status:  Inpatient    1940 MRN [id number]   [facility] 2NE-A Attending Kasey Cordoba MD   Hosp Day # 6 PCP Inez Cisneros MD     + looses stools  Denies abd pain  No n/v  Appet UNIT/ML flexpen 1-5 Units 1-5 Units Subcutaneous TID PC       Physical Exam:  Vital signs: Blood pressure 149/54, pulse 79, temperature 98.4 °F (36.9 °C), temperature source Oral, resp. rate 16, height 63\", weight 106 lb 0.7 oz, SpO2 95 %.   General: No ac free to call me with any questions or concerns.     Trini Whitaker MD  8/12/2019

## 2019-08-12 NOTE — DIETARY MALNUTRITION NOTE
[facility]    NUTRITION INITIAL ASSESSMENT    Pt meets moderate malnutrition criteria.     CRITERIA FOR MALNUTRITION DIAGNOSIS:  Criteria for non-severe malnutrition diagnosis: chronic illness related to wt loss 7.5% in 3 months, energy intake less autumn diet. PO 25-50%. Multiple ONS on order to help meet nutritional needs. Wt up 2kg since admission      66: 67year old female presenting with AMS, very weak from rehab pt with hyponatremia, Na + 122. Pt with E coli bacteremia.    Pt has had ongoing diarrhea per physical exam.    NUTRITION PRESCRIPTION: using CBW 49kg  Calories: 2914-0006 calories/day (30-35 calories per kg)  Protein: 64-74 grams protein/day (1.3-1.5 grams protein per kg)  Fluid: ~2000 mls or per MD discretion    MONITOR AND EVALUATE/NUTRITION

## 2019-08-12 NOTE — PROGRESS NOTES
[address] [facility] Follow Up    Giovanny Pedro  [id number]  Hospital Day #6  Date of Consult: 08/08/19  Patient seen at: [facility] 2561    Subjective:      Patient was seen and examined with her daughter at the bedside.    Antelmo Anderson is more MBP/add-vantage, 1 g, Intravenous, Q12H  •  Potassium Chloride ER (K-DUR M20) CR tab 20 mEq, 20 mEq, Oral, Daily  •  Metoclopramide HCl (REGLAN) injection 5 mg, 5 mg, Intravenous, TID  •  hydrALAzine HCl (APRESOLINE) tab 50 mg, 50 mg, Oral, Q8H [address]  •  pre 08/12/2019       Coags:  Lab Results   Component Value Date    INR 1.14 (H) 08/05/2019     Chemistry:  Lab Results   Component Value Date    CREATSERUM 0.55 08/12/2019    BUN 12 08/12/2019     (L) 08/12/2019    K 3.9 08/12/2019     08/12/2019 Assist  Total Care Mouth care Drowsy or coma   0 Death     Palliative Care Assessment     Goals of Care:   Her daughter just d/w'd her physician about the possibility of using comfort care services  She is tearful   She knows she needs to talk about this w Disposition: TBD - although Zach Saymaryam is talking more about SNF now and wants to talk with a SW re: financial aspects of SNF. SW consult order placed to assist    Our palliative care services will follow up again tomorrow re: Angelica Wynne.  Zach Sayres wants today to process

## 2019-08-13 NOTE — HOSPICE RN NOTE
Met with patient's daughter Marilyn Montana to review hospice philosophy and benefit. POC reviewed and questions answered. Marilyn Montana is having a difficult time and desires to think about information.   Hospice will follow up tomorrow; FAVIOLA Bass and Dr. Verner Bushman informed

## 2019-08-13 NOTE — CONSULTS
[address] [place]    PATIENT'S NAME: Pollo Bauer   ATTENDING PHYSICIAN: Wen Mckeon M.D.   [facility] [place]: Fredo Martínez M.D.    PATIENT ACCOUNT#:   [de-identified]    LOCATION:  [address] [place] [address]  MEDICAL RECORD #:   [id number]       DATE OF BIRTH: including in March, with abdominal discomfort, diarrhea. PAST MEDICAL HISTORY:  The patient has a history also in the past of PTCA of the coronary arteries done at [facility]. She has a diagnosis of polycystic kidney disease.   She also had a da only a type 2 endoleak, as there has been no change in the aneurysm size. Family members are not sure if they want any treatment done no matter what at this time due to her overall health.     Medications reviewed included aspirin, Imuran, cefepime, ferrou

## 2019-08-13 NOTE — PLAN OF CARE
Patient is drowsy but will awake and respond to voice, alert and oriented to self and place, knows she is in the hospital, not able to give date; blood pressure elevated, hydralazine PRN given as ordered and Dr Bhat Mis notified; cardiac monitor showing SR w

## 2019-08-13 NOTE — PROGRESS NOTES
[facility]  Progress Note    Tj Nugent Patient Status:  Inpatient    1940 MRN [id number]   [facility] 2NE-A Attending Kina Whitman MD   Hosp Day # 7 PCP Segun Mathis MD         SUBJECTIVE:  Subjective:  Tj Nugent i of the pelvis due to they a markedly distended bladder. There is uncomplicated diverticulosis of the sigmoid and left colon. proctitis. ABDOMINAL WALL:  No mass or hernia. Diffuse subcutaneous edema noted.   URINARY BLADDER:  Moderate to severe distenti and rhythm, S1 and S2 normal,     Abdomen:     Soft, non-tender, bowel sounds active all four quadrants,                Extremities:    no cyanosis, icterus                                   Neurologic :  General weakness, Tartrate  25 mg Oral 2x Daily(Beta Blocker)   • Pravastatin Sodium  20 mg Oral Nightly   • tacrolimus  0.5 mg Oral QPM   • azathioprine  50 mg Oral Daily   • ferrous sulfate  325 mg Oral Daily with breakfast   • Heparin Sodium (Porcine)  5,000 Units Subcut Disposition per .      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

## 2019-08-13 NOTE — PROGRESS NOTES
[facility]  Progress Note    Padmini Toscano Patient Status:  Inpatient    1940 MRN [id number]   [facility] 2NE-A Attending Kasey Cordoba MD   Hosp Day # 7 PCP MD Veronica Wray; unable to provide much history  Lita brimonidine Tartrate (ALPHAGAN) 0.2 % ophthalmic solution 1 drop 1 drop Both Eyes BID   And      Timolol Maleate (TIMOPTIC) 0.5 % ophthalmic solution 1 drop 1 drop Both Eyes BID   Insulin Aspart Pen (NOVOLOG) 100 UNIT/ML flexpen 1-5 Units 1-5 Units Subcu UA/urinary retention - s/p beth placement; Urine clx w/ enterobacter cloacae  Abs per ID  5. S/p AAA repair in past - vascular eval noted  6. DM    DNR    Thank you for allowing me to participate in this patient's care.   Please feel free to call me with a

## 2019-08-13 NOTE — PLAN OF CARE
Received patient at [address] [place] [address] Road. Patient is alert to place and self. NSR on tele, SpO2 96% on room air. Patient is briefed and has a beth, intact and draining clear yellow urine. Patient is a total lift and a total feed. Pt.  Takes meds crushed with apples maintained  Description  INTERVENTIONS:  - Monitor labs and assess for signs and symptoms of volume excess or deficit  - Monitor intake, output and patient weight  - Monitor urine specific gravity, serum osmolarity and serum sodium as indicated or ordered

## 2019-08-13 NOTE — PLAN OF CARE
Assumed care of patient around 0730. Pt alert to place and self. Very lethargic/drowsy today. Pt briefly opens eyes only if instructed to do so, and quickly closes them. Q2H. Camila area reddened, magic butt paste ordered.  Pt at bedside majority of the day, patient weight  - Monitor urine specific gravity, serum osmolarity and serum sodium as indicated or ordered  - Monitor response to interventions for patient's volume status, including labs, urine output, blood pressure (other measures as available)  Margie West

## 2019-08-13 NOTE — PROGRESS NOTES
[address] [facility],[address] Floor Follow Up     Murtis Deal  [id number]  Hospital Day #7  Date of Consult: 08/08/19  Patient seen at: [facility]     Subjective:      Patient was seen Pravastatin Sodium (PRAVACHOL) tab 20 mg, 20 mg, Oral, Nightly  •  tacrolimus (PROGRAF) cap 0.5 mg, 0.5 mg, Oral, QPM  •  azathioprine (IMURAN) tab 50 mg, 50 mg, Oral, Daily  •  ferrous sulfate EC tab 325 mg, 325 mg, Oral, Daily with breakfast  •  hydrALAz 19.53 kg/m²     General: Appears frail  Body habitus: Thin  HEENT:  Mucus membranes dry  Cardiac: Regular rate   Lungs:   Normal excursions and effort. Abdomen: Soft, non-tender, non-distended  Extremities: Without clubbing, cyanosis.   Neurologic: Drowsy, decisions Sumanth Guillermo is making related to Marsha's health. She supports her decisions as the HCPOA. Sumanth Guillermo shared her mother stated she saw \"TA TA,\" her . Sumanth Guillermo finds her mother's words comforting.         Advance Care Planning counseling and discussio

## 2019-08-13 NOTE — CM/SW NOTE
SW received order for [facility] hospice eval. Met with pt's daughter, [facility], at bedside. She said she used [facility] for her father. [facility] is tearful and not yet ready to meet with [facility]. She said that she just wants pt to be comfortable.  Caitlyn Meeks

## 2019-08-14 NOTE — PROGRESS NOTES
[address] [facility],[address] Floor Follow Up     Chuyita Miller  [id number]  Hospital Day #8  Date of Consult: 08/08/19  Patient seen at: [facility]     Subjective:      Patient was seen Pravastatin Sodium (PRAVACHOL) tab 20 mg, 20 mg, Oral, Nightly  •  tacrolimus (PROGRAF) cap 0.5 mg, 0.5 mg, Oral, QPM  •  azathioprine (IMURAN) tab 50 mg, 50 mg, Oral, Daily  •  ferrous sulfate EC tab 325 mg, 325 mg, Oral, Daily with breakfast  •  hydrALAz BMI 19.53 kg/m²     General: Appears frail  Body habit: Thin   HEENT:  Mucus membranes dry  Cardiac: Tachy  Skin: Warm and dry.     Palliative Performance Scale: 20%    Palliative Performance Scale   % Ambulation Activity Level Self-Care Intake Consciousnes process for comfort care/ hospice. 2. CODE STATUS: DNR/DNI and comfort care  3. POLST: completed   - [address], daughter  - Psychosocial: Emotional support provided to Lorraine Bennett.      - Disposition: SNF with hospice    - Discussed today’s

## 2019-08-14 NOTE — PLAN OF CARE
1930 Assume pt care. Pt drowsy, will answer questions but will not open eyes. Able to swallow pills crushed with applesauce-aspiration precautions in place. Daughter at bedside. Pt denies pain. Nsr on tele, O2 sat 94% on room air. Koch in place.  Total lif limits  Description  INTERVENTIONS:  - Monitor labs and rhythm and assess patient for signs and symptoms of electrolyte imbalances  - Administer electrolyte replacement as ordered  - Monitor response to electrolyte replacements, including rhythm and repeat highest/safest level of mobility/gait  Description  Interventions:  - Assess patient's functional ability and stability  - Promote increasing activity/tolerance for mobility and gait  - Educate and engage patient/family in tolerated activity level and prec

## 2019-08-14 NOTE — DISCHARGE SUMMARY
[facility]  Discharge Summary    Janak Ramos Patient Status:  Inpatient    1940 MRN [id number]   [facility] 2NE-A Attending Garry Ramirez MD   [address] Day # 8 PCP Eber Alfaro MD     Date of Admission: 2019    Date of Disc yesterday on floor- sadie sommers     D/w hospice rn radha on phone 8/13        D/w rn danna on floor     D/w dtr velasquez in room today     Radiology :           FINDINGS:    LIVER: Tray Reel noted is a small liver with innumerable cysts.   BILIARY:  No visible distention of the bladder without focal mass. PELVIC NODES:  No adenopathy.    PELVIC ORGANS:  Status post hysterectomy.   BONES:  No bony lesion or fracture.    LUNG BASES:  Small bilateral posterior pleural effusions with minimal adjacent passive atelect                                                 Data Review:       Labs:             Recent Labs   Lab 08/08/19  0525 08/11/19  0514 08/12/19  0443 08/13/19  0613   WBC 5.1 5.4 5.2 4.6   HGB 9.8* 10.0* 9.4* 9.7*   MCV 86.6 89.2 90.7 90.5   .0 225. Tartrate  1 drop Both Eyes BID     And   • Timolol Maleate  1 drop Both Eyes BID   • Insulin Aspart Pen  1-5 Units Subcutaneous TID PC      • sodium chloride 75 mL/hr at 08/13/19 2567      Vitamins A & D **AND** zinc oxide **AND** Alum & Mag Hydroxide-Oliver sapovirus +/ rn to d/w id r/o c diff     13    uti-  abt        Poor prognosis  Family decided hospice  Defer to sw  Dc plan  See tests ordered,  Available and radiology reviewed  All consultant notes reviewed  Discussed with nursing on floor  dvt prophyla mouth daily. latanoprost 0.005 % Ophthalmic Solution  Place 1 drop into both eyes nightly. denosumab (PROLIA) 60 MG/ML Subcutaneous Solution  Inject 60 mg into the skin every 6 (six) months.     Brimonidine Tartrate-Timolol (COMBIGAN) 0.2-0.5 % Ophtha

## 2019-08-14 NOTE — PLAN OF CARE
Patient not verbally responding, makes faces, not able to give medications or food safely so being held; blood pressure elevated, as needed hydralazine ordered; cardiac monitor showing SR with PAC vs SA; lung sounds diminished, on room air, no cough noted;

## 2019-08-14 NOTE — CM/SW NOTE
Pt was discussed in rounds, with [facility] team, they will reevaluate for GIP later today.     Vicky Mcnamara, CTL  Case Management

## 2019-08-14 NOTE — PROGRESS NOTES
Patient being transitioned to hospice/comfort-measures; care discussed with nurse. Will follow peripherally. Please call w/ questions.

## 2019-08-14 NOTE — CM/SW NOTE
Met with pt and daughter to discuss evaluation for hospice and placement. Pt not eligible for gip due to no symptoms needing to be managed. Discussed with daughter who needs time to figure out placement option and finances.   Mark meet tomorrow to confirm

## 2019-08-14 NOTE — PROGRESS NOTES
[facility]  Progress Note    Tura Pelt Patient Status:  Inpatient    1940 MRN [id number]   [facility] 2NE-A Attending Jamin Alvares MD   Hosp Day # 8 PCP Liz Nolan MD         SUBJECTIVE:  Subjective:  Tura Pelt i edema, the combination of findings suggest stercoral colitis   /loops of bowel are displaced out of the pelvis due to they a markedly distended bladder. There is uncomplicated diverticulosis of the sigmoid and left colon. proctitis.   ABDOMINAL WALL:  No Lungs:     Clear to auscultation bilaterally, respirations unlabored       Heart:    Regular rate and rhythm, S1 and S2 normal,     Abdomen:     Soft, non-tender, bowel sounds active all four quadrants,                Extremities:    no cyanosis, icterus 2x Daily(Beta Blocker)   • Pravastatin Sodium  20 mg Oral Nightly   • tacrolimus  0.5 mg Oral QPM   • azathioprine  50 mg Oral Daily   • ferrous sulfate  325 mg Oral Daily with breakfast   • Heparin Sodium (Porcine)  5,000 Units Subcutaneous Q12H   • donnie worker.      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

## 2019-08-14 NOTE — OCCUPATIONAL THERAPY NOTE
Per chart and RN, pt continues to remain lethargic. Unable to stay awake and follow instructions. Chart mentioned possible hospice consultation. OT will sign off. Spoke with RN.

## 2019-08-15 NOTE — PROGRESS NOTES
[facility]  Progress Note    Jud Prieto Patient Status:  Inpatient    1940 MRN [id number]   [facility] 2NE-A Attending Saeid Solomon MD   Hosp Day # 9 PCP Brad Guzmán MD         SUBJECTIVE:  Subjective:  Jud Prieto i wall thickening of the rectum with some perirectal edema, the combination of findings suggest stercoral colitis   /loops of bowel are displaced out of the pelvis due to they a markedly distended bladder.   There is uncomplicated diverticulosis of the sigmoi symmetrical, trachea midline,        thyroid:      no JVD   Lungs:     Coarse bilaterally, respirations unlabored       Heart:    Regular rate and rhythm, S1 and S2 normal,     Abdomen:     Soft, non-tender, bowel sounds active all four quadrants, Pantoprazole Sodium  40 mg Oral QAM AC   • metoprolol Tartrate  25 mg Oral 2x Daily(Beta Blocker)   • Pravastatin Sodium  20 mg Oral Nightly   • tacrolimus  0.5 mg Oral QPM   • azathioprine  50 mg Oral Daily   • ferrous sulfate  325 mg Oral Daily with jamaal dose.      10.     Disposition per .      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

## 2019-08-15 NOTE — CM/SW NOTE
KEVAN met with pt and hospice rn. Pt was given a dose of morphine and ativan for dyspnea and congestion. Pt appeared comfortable with dose. mani faye contacted, message left with admissions.   Spoke to [place]

## 2019-08-15 NOTE — PROGRESS NOTES
08/14/19 3783   Clinical Encounter Type   Visited With Patient and family together;Health care provider   Routine Visit Introduction  ( responded to page request for family support.)   Continue Visiting No  (upon request or as needed)   Crisis V

## 2019-08-15 NOTE — DIETARY NOTE
Clinical Nutrition    Pt is now noted to be under Comfort Care Measures. RD will sign off case. Please consult as needed.      Ewa Yun RD,LDN  Pager #[phone]

## 2019-08-15 NOTE — PROGRESS NOTES
[address] [facility],[address] Floor Follow Up     Bryan Navarro  [id number]  Hospital Day #9  Date of Consult: 08/08/19  Patient seen at: [facility]     Subjective:      Patient was seen file.    Labs/ imaging     Hematology:  Lab Results   Component Value Date    WBC 6.4 08/15/2019    HGB 11.1 (L) 08/15/2019    HCT 35.0 08/15/2019    .0 08/15/2019       Coags:  Lab Results   Component Value Date    INR 1.14 (H) 08/05/2019     Chemi Extensive Disease  Can’t do any work Max Assist  Total Care Reduced Drowsy/confused   20 Bedbound Extensive Disease  Can’t do any work Max Assist  Total Care Minimal Drowsy/confused   10 Bedbound/coma Extensive Disease  Can’t do any work  coma  Max Assist

## 2019-08-15 NOTE — PLAN OF CARE
Pt is nonverbal, withdraws from pain. Unable to give po meds. Mild fever, ice packs given. VSS, prn hydralazine given. NSR/ST on tele. Koch in for retention, incontinent of bowel. Total lift, Q2H turn. Shavon area red, shavon care prn. Frequent oral care.  Jeramie Washington renal function maintained  Description  INTERVENTIONS:  - Monitor labs and assess for signs and symptoms of volume excess or deficit  - Monitor intake, output and patient weight  - Monitor urine specific gravity, serum osmolarity and serum sodium as indica Progressing

## 2019-08-16 NOTE — PLAN OF CARE
Pt remains drowsy. Does respond to pain, but doesn't open eyes. SR/ST on tele. Temp of 101; daughter refused cool packs & rectal tylenol. Resting comfortably with no s/s of distress. PRN morphine, oral atropine administered. Q2 turn.  Daughter at bedside &

## 2019-08-16 NOTE — PLAN OF CARE
RN SHIFT NOTE    Assumed care of pt at 1900. Pt is drowsy and nonverbal. Continuing to monitor. Pt is NSR/ST on tele and S1 and S2 is present. Lung sounds clear/diminished bilaterally. Abdomen soft and round. Last known BM on 8/15/19.  Non-pitting edema not Instruct patient on fluid and nutrition restrictions as appropriate  Outcome: Progressing

## 2019-08-16 NOTE — PROGRESS NOTES
[facility]  Progress Note    Chuyita Fieldsrosia Patient Status:  Inpatient    1940 MRN [id number]   [facility] 2NE-A Attending Bernarda Durand MD   Hosp Day # 10 PCP Jeffrey Teresa MD         SUBJECTIVE:  Subjective:  Chuyita Miller uncomplicated diverticulosis of the sigmoid and left colon. proctitis. ABDOMINAL WALL:  No mass or hernia. Diffuse subcutaneous edema noted. URINARY BLADDER:  Moderate to severe distention of the bladder without focal mass.   PELVIC NODES:  No adenopath active all four quadrants,                Extremities:    no cyanosis, icterus                                   Neurologic :  General weakness,                                       Data Review:       Labs:     Recent Labs   Lab 08/11/19  0514 08/12/19  0 altered mental status, possibly metabolic encephalopathy,     2. Hyponatremia. 3.       Hypertension. 4.       Coronary artery disease. 5.       Gastroesophageal reflux disease. .     6. Dyslipidemia, .     7.       History of k

## 2019-08-16 NOTE — PROGRESS NOTES
08/16/19 1402   Clinical Encounter Type   Visited With Patient and family together   Routine Visit Follow-up   Continue Visiting   (Call from [facility]))   Sacramental Encounters   Sacrament of Sick-Anointing Anointed   The patient

## 2019-08-16 NOTE — PROGRESS NOTES
[address] [facility],[address] Floor Follow Up     Ivett Joseph  [id number]  Hospital Day #10  Date of Consult: 08/08/19  Patient seen at: [facility]     Subjective:      Patient was see Coags:  Lab Results   Component Value Date    INR 1.14 (H) 08/05/2019     Chemistry:  Lab Results   Component Value Date    CREATSERUM 0.47 (L) 08/15/2019    BUN 9 08/15/2019     (L) 08/15/2019    K 3.7 08/15/2019    CL 91 (L) 08/15/2019    CO2 Assist  Total Care Reduced Drowsy/confused   20 Bedbound Extensive Disease  Can’t do any work Max Assist  Total Care Minimal Drowsy/confused   10 Bedbound/coma Extensive Disease  Can’t do any work  coma  Max Assist  Total Care Mouth care Drowsy or coma   0 consult; which included all of the following: direct face to face contact, and >50% was spent counseling and coordinating care. Palliative care will sign off once Hospice transition is in place.      [address] [facility] [employer]  Phone [phone]

## 2019-08-16 NOTE — CM/SW NOTE
SW checked into room to meet with daughter. No family present. Pt received 5 doses of morphine in 24 hours; may be appropriate for gip. Will have hospice rn eval when daughter arrives.

## 2019-08-16 NOTE — HOSPICE RN NOTE
Pt has SOB, labored breathing. She is re-evaluated for inpatient hospice.   Reviewed POC with Dr. Bobby Montoya, attending and Dr. Taco Nieves, Hospice Medical Director who are in agreement with adm to [facility] level of care for dx bacteremia for management of dyspn

## 2019-08-17 NOTE — H&P
[facility]  Progress Note    Ramon Nunn Patient Status:  Inpatient    1940 MRN [id number]   [facility] 2NE-A Attending Allison Chow MD   Hosp Day # 1 PCP Dinah Arthur MD       Cc- ams/ ftt    [place]- admitted with sepsis/ u stool in the rectum there is some circumferential wall thickening of the rectum with some perirectal edema, the combination of findings suggest stercoral colitis   /loops of bowel are displaced out of the pelvis due to they a markedly distended bladder.   Veronica Guillory Normocephalic,  atraumatic   Neck:   Supple, symmetrical, trachea midline,        thyroid:      no JVD   Lungs:     Coarse bilaterally, respirations unlabored       Heart:    Regular rate and rhythm, S1 and S2 normal,     Abdomen:     Soft, non-tender, bow Gastroesophageal reflux disease. .     6. Dyslipidemia, . 7.       History of kidney transplant secondary to history of end-stage renal disease secondary to polycystic kidney disease. Currently on Imuran, prednisone, and tacrolimus.   This is bernadette

## 2019-08-17 NOTE — PROGRESS NOTES
Patient's oxygen saturation fell to 70%, went into room, patient with agonal breathing. Patient's daughter aware, had her daughters leave, after which patient stopped breathing. This writer and charge nurse, Elina Bonilla, pronounced patient at 8690.  Patient w

## 2019-08-17 NOTE — HOSPICE RN NOTE
Notified of patient having passed away. Hospice RN and  attended family for support. Grieving appropriately and family supportive of one another. Hospital  with family and providing support also.

## 2019-08-17 NOTE — PLAN OF CARE
Assumed care of Pt. At [address]. Pt. Is nonverbal. Daughter at the bedside. Pt. Is inpatient hospice and care is focused on comfort and end of life. Pt. Has 2 L's of O2 on for comfort. Pt. Has accessory muscle use and diminished bilateral breath sounds.  Pt. Jaime Clarke as appropriate  - Consider OT/PT consult to assist with strengthening/mobility  - Encourage toileting schedule  Outcome: Progressing

## 2019-08-17 NOTE — SPIRITUAL CARE NOTE
CHP present to provided emotional/spirtiual support to family. Pt appears to be in her comfortable/peaceful transition. CHP observes no signs of distress. Dtr at bedside, engaged and coping ok at this time.     Meliton Sena MA [address] [place] [address]

## 2019-08-17 NOTE — PLAN OF CARE
Patient nonverbal, sleeping, patient's daughter, Ken Clay, at the bedside; vitals every shift, patient has a temperature of 101.1 but Ken Clay does not want to be treated; no telemetry; lung sounds diminished, on 2 liter of oxygen via nasal cannula; edema to BUE

## 2019-08-17 NOTE — PROGRESS NOTES
Pt remains nonverbal; doesn't react to pain or manipulation while changing. ST on tele (pts daughter request to remain on). Koch in place. Inc of bowel. Q2 turn. Morphine drip started as pt is tachycardic & tachypneic w/ mildly labored breathing.  Vincent Syed

## 2019-08-17 NOTE — PROGRESS NOTES
08/17/19 7616   Clinical Encounter Type   Visited With Family; Health care provider  (patient's daughter and fiancee and daughter and nephew; RN Nya)   Crisis Visit Death  (Responded to page from pt.s RN)   Patient's Supportive Strategies/Resources Chapl

## 2019-08-20 NOTE — DISCHARGE SUMMARY
[facility]  Discharge Summary    Tharon Dec Patient Status:  Inpatient    1940 MRN [id number]   [facility] 2NE-A Attending No att. providers found   Hosp Day # 1 PCP Gallo Su MD     Date of Admission: 2019    Date o bedside.   Allastevensonsonusamaria Honey did not open her eyes to stimulation.     Ros- + for above otherwise neg     meds noted  Allergies noted      Soc hx- no tob/etoh     SUBJECTIVE:  Subjective:  Mily Lockett is a(n) 67year old female.        Weak  Lethargic  Doing poor colon.  proctitis. ABDOMINAL WALL:  No mass or hernia.  Diffuse subcutaneous edema noted. URINARY BLADDER:  Moderate to severe distention of the bladder without focal mass. PELVIC NODES:  No adenopathy.    PELVIC ORGANS:  Status post hysterectomy.   BONE       Extremities:    no cyanosis, icterus                                   Neurologic :  General weakness,                                                         Data Review:       Labs:             Recent Labs   Lab 08/11/19  0514 08/12/19  0443 08/1 tacrolimus.  This is being managed by Renal.     8.       DVT prophylaxis.  Subcutaneous heparin.     9.       Type 2 diabetes with complication.  We will have to hold oral medications while in the hospital.  Monitor oral intake and will use sliding scale
